# Patient Record
Sex: FEMALE | Race: WHITE | NOT HISPANIC OR LATINO | Employment: FULL TIME | URBAN - METROPOLITAN AREA
[De-identification: names, ages, dates, MRNs, and addresses within clinical notes are randomized per-mention and may not be internally consistent; named-entity substitution may affect disease eponyms.]

---

## 2017-12-16 ENCOUNTER — HOSPITAL ENCOUNTER (OUTPATIENT)
Dept: OBGYN | Facility: HOSPITAL | Age: 27
Discharge: HOME | End: 2017-12-16
Attending: OBSTETRICS & GYNECOLOGY | Admitting: OBSTETRICS & GYNECOLOGY

## 2018-02-22 ENCOUNTER — HOME CARE/HOSPICE - HEALTHEAST (OUTPATIENT)
Dept: HOME HEALTH SERVICES | Facility: HOME HEALTH | Age: 28
End: 2018-02-22

## 2018-02-26 ENCOUNTER — COMMUNICATION - HEALTHEAST (OUTPATIENT)
Dept: OBGYN | Facility: CLINIC | Age: 28
End: 2018-02-26

## 2018-03-20 ENCOUNTER — COMMUNICATION - HEALTHEAST (OUTPATIENT)
Dept: HEALTH INFORMATION MANAGEMENT | Facility: CLINIC | Age: 28
End: 2018-03-20

## 2021-05-30 ENCOUNTER — RECORDS - HEALTHEAST (OUTPATIENT)
Dept: ADMINISTRATIVE | Facility: CLINIC | Age: 31
End: 2021-05-30

## 2021-06-14 NOTE — PROGRESS NOTES
Pt states had possible pink discharge on toilet paper when voiding today.  No bleeding, no fluid leaking, denies contractions.  vag exam closed, no bleeding seen.  Dr oro aware, discharged with instructions

## 2021-07-06 ENCOUNTER — RECORDS - HEALTHEAST (OUTPATIENT)
Dept: LAB | Facility: CLINIC | Age: 31
End: 2021-07-06

## 2021-07-06 LAB — POTASSIUM BLD-SCNC: 4 MMOL/L (ref 3.5–5)

## 2021-07-20 PROCEDURE — 88305 TISSUE EXAM BY PATHOLOGIST: CPT | Mod: TC,ORL | Performed by: PLASTIC SURGERY

## 2021-07-21 ENCOUNTER — LAB REQUISITION (OUTPATIENT)
Dept: LAB | Facility: CLINIC | Age: 31
End: 2021-07-21
Payer: COMMERCIAL

## 2021-07-21 DIAGNOSIS — N64.4 MASTODYNIA: ICD-10-CM

## 2021-07-27 LAB
PATH REPORT.COMMENTS IMP SPEC: NORMAL
PATH REPORT.COMMENTS IMP SPEC: NORMAL
PATH REPORT.FINAL DX SPEC: NORMAL
PATH REPORT.GROSS SPEC: NORMAL
PATH REPORT.MICROSCOPIC SPEC OTHER STN: NORMAL
PATH REPORT.RELEVANT HX SPEC: NORMAL
PHOTO IMAGE: NORMAL

## 2021-07-27 PROCEDURE — 88305 TISSUE EXAM BY PATHOLOGIST: CPT | Mod: 26 | Performed by: PATHOLOGY

## 2021-08-21 ENCOUNTER — HEALTH MAINTENANCE LETTER (OUTPATIENT)
Age: 31
End: 2021-08-21

## 2021-10-16 ENCOUNTER — HEALTH MAINTENANCE LETTER (OUTPATIENT)
Age: 31
End: 2021-10-16

## 2022-10-01 ENCOUNTER — HEALTH MAINTENANCE LETTER (OUTPATIENT)
Age: 32
End: 2022-10-01

## 2022-12-02 ENCOUNTER — LAB REQUISITION (OUTPATIENT)
Dept: LAB | Facility: CLINIC | Age: 32
End: 2022-12-02
Payer: COMMERCIAL

## 2022-12-02 DIAGNOSIS — Z36.9 ENCOUNTER FOR ANTENATAL SCREENING, UNSPECIFIED: ICD-10-CM

## 2022-12-02 LAB
ABO/RH(D): NORMAL
ANTIBODY SCREEN: NEGATIVE
BASOPHILS # BLD AUTO: 0 10E3/UL (ref 0–0.2)
BASOPHILS NFR BLD AUTO: 0 %
EOSINOPHIL # BLD AUTO: 0.3 10E3/UL (ref 0–0.7)
EOSINOPHIL NFR BLD AUTO: 3 %
ERYTHROCYTE [DISTWIDTH] IN BLOOD BY AUTOMATED COUNT: 14.1 % (ref 10–15)
HBV SURFACE AG SERPL QL IA: NONREACTIVE
HCT VFR BLD AUTO: 39.1 % (ref 35–47)
HCV AB SERPL QL IA: NONREACTIVE
HGB BLD-MCNC: 12.6 G/DL (ref 11.7–15.7)
HIV 1+2 AB+HIV1 P24 AG SERPL QL IA: NONREACTIVE
IMM GRANULOCYTES # BLD: 0 10E3/UL
IMM GRANULOCYTES NFR BLD: 0 %
LYMPHOCYTES # BLD AUTO: 2 10E3/UL (ref 0.8–5.3)
LYMPHOCYTES NFR BLD AUTO: 19 %
MCH RBC QN AUTO: 28.2 PG (ref 26.5–33)
MCHC RBC AUTO-ENTMCNC: 32.2 G/DL (ref 31.5–36.5)
MCV RBC AUTO: 88 FL (ref 78–100)
MONOCYTES # BLD AUTO: 0.6 10E3/UL (ref 0–1.3)
MONOCYTES NFR BLD AUTO: 6 %
NEUTROPHILS # BLD AUTO: 7.7 10E3/UL (ref 1.6–8.3)
NEUTROPHILS NFR BLD AUTO: 72 %
NRBC # BLD AUTO: 0 10E3/UL
NRBC BLD AUTO-RTO: 0 /100
PLATELET # BLD AUTO: 351 10E3/UL (ref 150–450)
RBC # BLD AUTO: 4.47 10E6/UL (ref 3.8–5.2)
SPECIMEN EXPIRATION DATE: NORMAL
T PALLIDUM AB SER QL: NONREACTIVE
WBC # BLD AUTO: 10.7 10E3/UL (ref 4–11)

## 2022-12-02 PROCEDURE — 87389 HIV-1 AG W/HIV-1&-2 AB AG IA: CPT | Mod: ORL | Performed by: NURSE PRACTITIONER

## 2022-12-02 PROCEDURE — 85025 COMPLETE CBC W/AUTO DIFF WBC: CPT | Mod: ORL | Performed by: NURSE PRACTITIONER

## 2022-12-02 PROCEDURE — 87086 URINE CULTURE/COLONY COUNT: CPT | Mod: ORL | Performed by: NURSE PRACTITIONER

## 2022-12-02 PROCEDURE — 86850 RBC ANTIBODY SCREEN: CPT | Mod: ORL | Performed by: NURSE PRACTITIONER

## 2022-12-02 PROCEDURE — 86803 HEPATITIS C AB TEST: CPT | Mod: ORL | Performed by: NURSE PRACTITIONER

## 2022-12-02 PROCEDURE — 86762 RUBELLA ANTIBODY: CPT | Mod: ORL | Performed by: NURSE PRACTITIONER

## 2022-12-02 PROCEDURE — 87340 HEPATITIS B SURFACE AG IA: CPT | Mod: ORL | Performed by: NURSE PRACTITIONER

## 2022-12-02 PROCEDURE — 86901 BLOOD TYPING SEROLOGIC RH(D): CPT | Mod: ORL | Performed by: NURSE PRACTITIONER

## 2022-12-02 PROCEDURE — 86780 TREPONEMA PALLIDUM: CPT | Mod: ORL | Performed by: NURSE PRACTITIONER

## 2022-12-04 LAB — BACTERIA UR CULT: NORMAL

## 2022-12-05 LAB
RUBV IGG SERPL QL IA: 3.52 INDEX
RUBV IGG SERPL QL IA: POSITIVE

## 2023-01-18 ENCOUNTER — LAB REQUISITION (OUTPATIENT)
Dept: LAB | Facility: CLINIC | Age: 33
End: 2023-01-18

## 2023-01-18 DIAGNOSIS — R51.9 HEADACHE, UNSPECIFIED: ICD-10-CM

## 2023-01-18 LAB
ALBUMIN MFR UR ELPH: 8.8 MG/DL (ref 1–14)
ALT SERPL W P-5'-P-CCNC: 40 U/L (ref 10–35)
AST SERPL W P-5'-P-CCNC: 25 U/L (ref 10–35)
CREAT SERPL-MCNC: 0.52 MG/DL (ref 0.51–0.95)
CREAT UR-MCNC: 109 MG/DL
ERYTHROCYTE [DISTWIDTH] IN BLOOD BY AUTOMATED COUNT: 14 % (ref 10–15)
GFR SERPL CREATININE-BSD FRML MDRD: >90 ML/MIN/1.73M2
HCT VFR BLD AUTO: 36.6 % (ref 35–47)
HGB BLD-MCNC: 11.9 G/DL (ref 11.7–15.7)
MCH RBC QN AUTO: 27.7 PG (ref 26.5–33)
MCHC RBC AUTO-ENTMCNC: 32.5 G/DL (ref 31.5–36.5)
MCV RBC AUTO: 85 FL (ref 78–100)
PLATELET # BLD AUTO: 296 10E3/UL (ref 150–450)
PROT/CREAT 24H UR: 0.08 MG/MG CR (ref 0–0.2)
RBC # BLD AUTO: 4.29 10E6/UL (ref 3.8–5.2)
WBC # BLD AUTO: 10.2 10E3/UL (ref 4–11)

## 2023-01-18 PROCEDURE — 84460 ALANINE AMINO (ALT) (SGPT): CPT | Performed by: OBSTETRICS & GYNECOLOGY

## 2023-01-18 PROCEDURE — 82565 ASSAY OF CREATININE: CPT | Performed by: OBSTETRICS & GYNECOLOGY

## 2023-01-18 PROCEDURE — 84450 TRANSFERASE (AST) (SGOT): CPT | Performed by: OBSTETRICS & GYNECOLOGY

## 2023-01-18 PROCEDURE — 85027 COMPLETE CBC AUTOMATED: CPT | Performed by: OBSTETRICS & GYNECOLOGY

## 2023-01-18 PROCEDURE — 84156 ASSAY OF PROTEIN URINE: CPT | Performed by: OBSTETRICS & GYNECOLOGY

## 2023-04-13 ENCOUNTER — HOSPITAL ENCOUNTER (EMERGENCY)
Facility: CLINIC | Age: 33
Discharge: HOME | End: 2023-04-14
Attending: EMERGENCY MEDICINE | Admitting: EMERGENCY MEDICINE
Payer: COMMERCIAL

## 2023-04-13 ENCOUNTER — APPOINTMENT (OUTPATIENT)
Dept: ULTRASOUND IMAGING | Facility: CLINIC | Age: 33
End: 2023-04-13
Attending: EMERGENCY MEDICINE
Payer: COMMERCIAL

## 2023-04-13 DIAGNOSIS — M79.89 SWELLING OF LEFT FOOT: ICD-10-CM

## 2023-04-13 DIAGNOSIS — Z33.1 PREGNANT STATE, INCIDENTAL: ICD-10-CM

## 2023-04-13 PROCEDURE — 99284 EMERGENCY DEPT VISIT MOD MDM: CPT | Mod: 25

## 2023-04-13 PROCEDURE — 93971 EXTREMITY STUDY: CPT | Mod: LT

## 2023-04-13 RX ORDER — BUTALBITAL, ACETAMINOPHEN AND CAFFEINE 50; 325; 40 MG/1; MG/1; MG/1
TABLET ORAL
COMMUNITY

## 2023-04-13 RX ORDER — FLUOXETINE 40 MG/1
1 CAPSULE ORAL DAILY
Status: ON HOLD | COMMUNITY
Start: 2022-05-19 | End: 2023-06-25

## 2023-04-13 RX ORDER — PRENATAL VIT 116/IRON/FA/DHA 28-800-200
CAPSULE ORAL
COMMUNITY

## 2023-04-13 ASSESSMENT — ACTIVITIES OF DAILY LIVING (ADL): ADLS_ACUITY_SCORE: 35

## 2023-04-14 VITALS
SYSTOLIC BLOOD PRESSURE: 129 MMHG | HEART RATE: 84 BPM | TEMPERATURE: 97.8 F | WEIGHT: 277 LBS | OXYGEN SATURATION: 97 % | DIASTOLIC BLOOD PRESSURE: 60 MMHG | BODY MASS INDEX: 50.97 KG/M2 | RESPIRATION RATE: 16 BRPM | HEIGHT: 62 IN

## 2023-04-14 NOTE — DISCHARGE INSTRUCTIONS
You were seen in the Emergency Department today for leg and foot swelling.     I would recommend you continue trying to elevate your legs is much as possible to help decrease the swelling.      Please return to the ER if you experience chest pain, trouble breathing, and/or for any other new or concerning symptoms, otherwise please follow up with your primary doctor and OB/GYN for recheck.     Below is some information you might find useful.     Thank you for choosing [facility]. It was a pleasure taking care of you today!  - Dr. Jennifer Lovelace

## 2023-04-14 NOTE — ED PROVIDER NOTES
EMERGENCY DEPARTMENT ENCOUNTER      NAME: Ana Deng  YOB: 1990  MRN: [id number]    FINAL IMPRESSION  1. Swelling of left foot    2. Pregnant state, incidental        MEDICAL DECISION MAKING   Pertinent Labs & Imaging studies reviewed. (See chart for details)    Ana Deng is a 33 year old female who presents for evaluation of left foot swelling.  Patient is 29 weeks pregnant and reports that she has had some intermittent leg swelling during the pregnancy but earlier today when she got home from work, noticed that her left foot was significantly more swollen than the right.  She tried to elevate her legs and spoke with the nursing care line who advised that she monitor then follow-up tomorrow if no improvement.  She became anxious about potential blood clot so decided to come in.  She notes that since she got home from work and after elevating her legs, the swelling has decreased somewhat.  Patient travels and drives frequently for work.  She does not have a personal history of blood clots.  She reports that the pregnancy has been complicated mostly by feeling uncomfortable, occasional dyspnea, and nausea.  She notes that earlier in the pregnancy, she did have help syndrome ruled out.  Most recent appointment with OB went well.  Patient has no other new complaints and denies chest pain, dyspnea, fevers, abdominal pain, vaginal bleeding or loss of fluid. Remainder of history and exam, as below.     I considered a broad differential including but not limited to DVT, lymphedema, venous insufficiency, lymphangitis or lymph obstruction, cellulitis, muscular strain/sprain.  There is no significant erythema, warmth to suggest infectious process.  Discussed options for work-up and management with patient.  We have agreed on plan for ultrasound to rule out DVT or other vascular process.  Patient declined offer for analgesic.    I rechecked the patient.  Unfortunately, there was some delay in obtaining  imaging due to backup in the department.  She remained comfortable without any symptoms.  Patient was signed out to night ED provider pending results of ultrasound.  I anticipate she will be able to discharge home when this returns and follow-up closely with OB/GYN.        Medical Decision Making    History:    Supplemental history from: Documented in chart, if applicable    External Record(s) reviewed: Documented in chart, if applicable.    Work Up:    Chart documentation includes differential considered and any EKGs or imaging independently interpreted by provider, where specified.    In additional to work up documented, I considered the following work up: Documented in chart, if applicable.    External consultation:    Discussion of management with another provider: Documented in chart, if applicable    Complicating factors:    Care impacted by chronic illness: Mental Health    Care affected by social determinants of health: N/A    Disposition considerations: Admission considered. Patient was signed out to the oncoming physician, disposition pending.      ED COURSE  10:06 PM I met the patient and performed my initial interview and exam.    11:28 PM I rechecked the patient.  She is still awaiting ultrasound.    MEDICATIONS GIVEN IN THE ED  Medications - No data to display    NEW PRESCRIPTIONS STARTED AT TODAY'S VISIT  New Prescriptions    No medications on file          =================================================================    Chief Complaint   Patient presents with     Leg Swelling     Left greater than right         HPI:    Patient information was obtained from: Patient    Use of : N/A     Ana Deng is a 33 year old female who presents with leg swelling.     Patient reports that she is 27 weeks pregnant and has had normal leg swelling with this. Today she had an episode of extra swelling to her left foot that was much greater than her right. She elevated her leg for an hour which  "reduced the swelling. Patient is concerned for a blood clot as she had one episode of shooting pain during the swelling. Patient notes she travels for work and has not been taking a baby aspirin. No trauma to foot. Denies any fever, chills, chest pain, or any other complaints at this time.       RELEVANT HISTORY, MEDICATIONS, & ALLERGIES   Past medical history, surgical history, family history, medications, and allergies reviewed and pertinent noted in HPI.    REVIEW OF SYSTEMS:  A complete review of systems was performed with pertinent positives and negatives noted in the HPI. All other systems negative.     PHYSICAL EXAM:    Vitals: /84   Pulse 90   Temp 97.8  F (36.6  C) (Temporal)   Resp 15   Ht 1.575 m (5' 2\")   Wt 125.6 kg (277 lb)   SpO2 98%   BMI 50.66 kg/m     General: Alert and interactive, comfortable appearing.  HENT: Atraumatic. Full AROM of neck. Conjunctiva clear.   Cardiovascular: Regular rate and rhythm.   Chest/Pulmonary: Normal work of breathing. Speaking in complete sentences.   Abdomen: Soft, gravid. Nontender without guarding or rebound.  Extremities: Normal AROM of all major joints.  No tenderness to palpation of calves.  Left lower extremity: Very mild edema of left foot compared to contralateral side.  No overlying erythema, warmth, tenderness.  No visible rashes or deformities.  Palpable pulses.  Sensation intact.  Skin: Warm and dry. Normal skin color.   Neuro: Speech clear. CNs grossly intact. Moves all extremities spontaneously.  Ambulatory.  Psych: Normal affect/mood, cooperative, memory appropriate.      RADIOLOGY  US Lower Extremity Venous Duplex Left    (Results Pending)       I, Manpreet Melendez, am serving as a scribe to document services personally performed by Dr. Jennifer Lovelace based on my observation and the provider's statements to me. I, Jennifer Lovelace MD attest that Manpreet Melendez is acting in a scribe capacity, has observed my performance of the services and has documented " them in accordance with my direction.    Jennifer Lovelace M.D.  Emergency Medicine  [facility] EMERGENCY ROOM  [address] [place] [id number]  [phone]  Dept: [phone]     Jennifer Lovelace MD  04/13/23 7155

## 2023-04-14 NOTE — ED TRIAGE NOTES
Pt reports that she is 27 weeks pregnant. And tonight nooted increased edema of her left greater than right ankle.   Pt also rports discomfort that is generalized r/t her pregnancy. 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

## 2023-04-14 NOTE — ED NOTES
"EMERGENCY DEPARTMENT SIGN OUT NOTE        ED COURSE AND MEDICAL DECISION MAKING  Patient was signed out to me by Dr Jennifer Lovelace at 11:54 PM     In brief, Ana Deng is a 33 year old female who initially presented with leg swelling     \"Patient reports that she is 27 weeks pregnant and has had normal leg swelling with this. Today she had an episode of extra swelling to her left foot that was much greater than her right. She elevated her leg for an hour which reduced the swelling. Patient is concerned for a blood clot as she had one episode of shooting pain during the swelling. Patient notes she travels for work and has not been taking a baby aspirin. No trauma to foot. Denies any fever, chills, chest pain, or any other complaints at this time. \"    At time of sign out, disposition was pending ultrasound results     Ultrasound negative.  Likely due to uterus compressing vasculature.  Discussed this with the patient.  She will follow-up with OB.    FINAL IMPRESSION    1. Swelling of left foot    2. Pregnant state, incidental        ED MEDS  Medications - No data to display    LAB  Labs Ordered and Resulted from Time of ED Arrival to Time of ED Departure - No data to display    EKG      RADIOLOGY    US Lower Extremity Venous Duplex Left    (Results Pending)       DISCHARGE MEDS  New Prescriptions    No medications on file       Blanco Plasencia MD  [facility] EMERGENCY ROOM  [address] [place] [id number] [phone]     Blanco Plasencia MD  04/14/23 0029    "

## 2023-04-21 ENCOUNTER — LAB REQUISITION (OUTPATIENT)
Dept: LAB | Facility: CLINIC | Age: 33
End: 2023-04-21

## 2023-04-21 DIAGNOSIS — Z11.3 ENCOUNTER FOR SCREENING FOR INFECTIONS WITH A PREDOMINANTLY SEXUAL MODE OF TRANSMISSION: ICD-10-CM

## 2023-04-21 PROCEDURE — 86780 TREPONEMA PALLIDUM: CPT | Performed by: NURSE PRACTITIONER

## 2023-04-22 LAB — T PALLIDUM AB SER QL: NONREACTIVE

## 2023-04-26 ENCOUNTER — PRE VISIT (OUTPATIENT)
Dept: MATERNAL FETAL MEDICINE | Facility: HOSPITAL | Age: 33
End: 2023-04-26
Payer: COMMERCIAL

## 2023-04-26 ENCOUNTER — TRANSCRIBE ORDERS (OUTPATIENT)
Dept: MATERNAL FETAL MEDICINE | Facility: HOSPITAL | Age: 33
End: 2023-04-26
Payer: COMMERCIAL

## 2023-04-26 DIAGNOSIS — O26.90 PREGNANCY RELATED CONDITION, ANTEPARTUM: Primary | ICD-10-CM

## 2023-05-01 ENCOUNTER — OFFICE VISIT (OUTPATIENT)
Dept: MATERNAL FETAL MEDICINE | Facility: HOSPITAL | Age: 33
End: 2023-05-01
Attending: NURSE PRACTITIONER
Payer: COMMERCIAL

## 2023-05-01 ENCOUNTER — ANCILLARY PROCEDURE (OUTPATIENT)
Dept: ULTRASOUND IMAGING | Facility: HOSPITAL | Age: 33
End: 2023-05-01
Attending: NURSE PRACTITIONER
Payer: COMMERCIAL

## 2023-05-01 DIAGNOSIS — O35.9XX0 SUSPECTED FETAL ANOMALY, ANTEPARTUM, SINGLE OR UNSPECIFIED FETUS: Primary | ICD-10-CM

## 2023-05-01 DIAGNOSIS — Z03.73 SUSPECTED FETAL ANOMALY NOT FOUND: ICD-10-CM

## 2023-05-01 DIAGNOSIS — O26.90 PREGNANCY RELATED CONDITION, ANTEPARTUM: ICD-10-CM

## 2023-05-01 DIAGNOSIS — O99.210 OBESITY IN PREGNANCY: ICD-10-CM

## 2023-05-01 PROCEDURE — 76811 OB US DETAILED SNGL FETUS: CPT

## 2023-05-01 PROCEDURE — 99203 OFFICE O/P NEW LOW 30 MIN: CPT | Mod: 25 | Performed by: OBSTETRICS & GYNECOLOGY

## 2023-05-01 PROCEDURE — 76811 OB US DETAILED SNGL FETUS: CPT | Mod: 26 | Performed by: OBSTETRICS & GYNECOLOGY

## 2023-05-01 NOTE — NURSING NOTE
Patient reports positive fetal movement, denies pain, denies contractions/pre-term labor, leaking of fluid, or bleeding. Patient denies headache, visual changes, nausea/vomiting, epigastric pain related to preeclampsia. Education provided to patient on L2, POC. SBAR given to MICHELLE BILLINGS, see their note in Epic.    Ayleen Viveros RN

## 2023-05-01 NOTE — PROGRESS NOTES
[facility] Clinic Visit    Ana presents to [facility] clinic for ultrasound and recommendations. The following problems were addressed:    Maternal obesity  Suspected fetal anomaly, not found    Tests Reviewed: cell free DNA screen, outside ultrasound report  Tests Ordered: none  Unique Records reviewed: na    Impression:  1) Quarles intrauterine pregnancy at 29w 4d gestational age.   2) None of the anomalies commonly detected by ultrasound were evident in the detailed fetal anatomic survey, however the ductal arch, hands and feet were seen suboptimally due to late gestational age.   3) Growth parameters and estimated fetal weight were consistent with established dates.  4) The amniotic fluid volume appeared normal.  5) Normal fetal activity for gestational age.    Plan:  Thank-you for referring your patient for a comprehensive ultrasound.  She had cell-free DNA screening showing the expected amounts of chromosomes 21, 18 & 13.    I discussed the findings on today's ultrasound with the patient. I reviewed that at a late gestational age, hands and feel are difficult to fully evaluate, and I don't anticipate this will get better over time. Given this, and the otherwise normal anatomy, Ana will plan to have subsequent ultrasounds done in your office. Due to BMI > 40, recommend a follow up ultrasound to assess fetal growth at 34 weeks with weekly BPP starting at that time.    Return to primary provider for continued prenatal care.    If you have questions regarding today's evaluation or if we can be of further service, please contact the [facility].    **Fetal anomalies may be present but not detected**    Amy Hatch MD  Maternal Fetal Medicine    Total Time: 10 minutes spent on the date of the encounter doing chart review, history and exam, documentation and further activities as noted above.

## 2023-05-19 ENCOUNTER — LAB REQUISITION (OUTPATIENT)
Dept: LAB | Facility: CLINIC | Age: 33
End: 2023-05-19

## 2023-05-19 DIAGNOSIS — O13.9 GESTATIONAL (PREGNANCY-INDUCED) HYPERTENSION WITHOUT SIGNIFICANT PROTEINURIA, UNSPECIFIED TRIMESTER: ICD-10-CM

## 2023-05-19 LAB
BASOPHILS # BLD AUTO: 0 10E3/UL (ref 0–0.2)
BASOPHILS NFR BLD AUTO: 0 %
EOSINOPHIL # BLD AUTO: 0.3 10E3/UL (ref 0–0.7)
EOSINOPHIL NFR BLD AUTO: 3 %
ERYTHROCYTE [DISTWIDTH] IN BLOOD BY AUTOMATED COUNT: 15.5 % (ref 10–15)
HCT VFR BLD AUTO: 36 % (ref 35–47)
HGB BLD-MCNC: 11.2 G/DL (ref 11.7–15.7)
IMM GRANULOCYTES # BLD: 0.1 10E3/UL
IMM GRANULOCYTES NFR BLD: 1 %
LYMPHOCYTES # BLD AUTO: 2.1 10E3/UL (ref 0.8–5.3)
LYMPHOCYTES NFR BLD AUTO: 22 %
MCH RBC QN AUTO: 27 PG (ref 26.5–33)
MCHC RBC AUTO-ENTMCNC: 31.1 G/DL (ref 31.5–36.5)
MCV RBC AUTO: 87 FL (ref 78–100)
MONOCYTES # BLD AUTO: 0.6 10E3/UL (ref 0–1.3)
MONOCYTES NFR BLD AUTO: 6 %
NEUTROPHILS # BLD AUTO: 6.6 10E3/UL (ref 1.6–8.3)
NEUTROPHILS NFR BLD AUTO: 68 %
NRBC # BLD AUTO: 0 10E3/UL
NRBC BLD AUTO-RTO: 0 /100
PLATELET # BLD AUTO: 281 10E3/UL (ref 150–450)
RBC # BLD AUTO: 4.15 10E6/UL (ref 3.8–5.2)
WBC # BLD AUTO: 9.7 10E3/UL (ref 4–11)

## 2023-05-19 PROCEDURE — 84550 ASSAY OF BLOOD/URIC ACID: CPT | Performed by: OBSTETRICS & GYNECOLOGY

## 2023-05-19 PROCEDURE — 85025 COMPLETE CBC W/AUTO DIFF WBC: CPT | Performed by: OBSTETRICS & GYNECOLOGY

## 2023-05-19 PROCEDURE — 84156 ASSAY OF PROTEIN URINE: CPT | Performed by: OBSTETRICS & GYNECOLOGY

## 2023-05-19 PROCEDURE — 80053 COMPREHEN METABOLIC PANEL: CPT | Performed by: OBSTETRICS & GYNECOLOGY

## 2023-05-20 LAB
ALBUMIN MFR UR ELPH: 8.9 MG/DL (ref 1–14)
ALBUMIN SERPL BCG-MCNC: 3.4 G/DL (ref 3.5–5.2)
ALP SERPL-CCNC: 168 U/L (ref 35–104)
ALT SERPL W P-5'-P-CCNC: 22 U/L (ref 10–35)
ANION GAP SERPL CALCULATED.3IONS-SCNC: 10 MMOL/L (ref 7–15)
AST SERPL W P-5'-P-CCNC: 17 U/L (ref 10–35)
BILIRUB SERPL-MCNC: 0.2 MG/DL
BUN SERPL-MCNC: 7.2 MG/DL (ref 6–20)
CALCIUM SERPL-MCNC: 9.2 MG/DL (ref 8.6–10)
CHLORIDE SERPL-SCNC: 100 MMOL/L (ref 98–107)
CREAT SERPL-MCNC: 0.49 MG/DL (ref 0.51–0.95)
CREAT UR-MCNC: 124 MG/DL
DEPRECATED HCO3 PLAS-SCNC: 22 MMOL/L (ref 22–29)
GFR SERPL CREATININE-BSD FRML MDRD: >90 ML/MIN/1.73M2
GLUCOSE SERPL-MCNC: 94 MG/DL (ref 70–99)
POTASSIUM SERPL-SCNC: 4.3 MMOL/L (ref 3.4–5.3)
PROT SERPL-MCNC: 6.2 G/DL (ref 6.4–8.3)
PROT/CREAT 24H UR: 0.07 MG/MG CR (ref 0–0.2)
SODIUM SERPL-SCNC: 132 MMOL/L (ref 136–145)
URATE SERPL-MCNC: 4.8 MG/DL (ref 2.4–5.7)

## 2023-06-16 ENCOUNTER — LAB REQUISITION (OUTPATIENT)
Dept: LAB | Facility: CLINIC | Age: 33
End: 2023-06-16

## 2023-06-16 ENCOUNTER — LAB REQUISITION (OUTPATIENT)
Dept: LAB | Facility: CLINIC | Age: 33
End: 2023-06-16
Payer: COMMERCIAL

## 2023-06-16 DIAGNOSIS — Z3A.35 35 WEEKS GESTATION OF PREGNANCY: ICD-10-CM

## 2023-06-16 DIAGNOSIS — Z3A.36 36 WEEKS GESTATION OF PREGNANCY: ICD-10-CM

## 2023-06-16 DIAGNOSIS — O13.9 GESTATIONAL (PREGNANCY-INDUCED) HYPERTENSION WITHOUT SIGNIFICANT PROTEINURIA, UNSPECIFIED TRIMESTER: ICD-10-CM

## 2023-06-16 LAB
ALBUMIN MFR UR ELPH: 18.7 MG/DL
CREAT UR-MCNC: 223 MG/DL
ERYTHROCYTE [DISTWIDTH] IN BLOOD BY AUTOMATED COUNT: 15.6 % (ref 10–15)
HCT VFR BLD AUTO: 36.9 % (ref 35–47)
HGB BLD-MCNC: 11.6 G/DL (ref 11.7–15.7)
MCH RBC QN AUTO: 26.9 PG (ref 26.5–33)
MCHC RBC AUTO-ENTMCNC: 31.4 G/DL (ref 31.5–36.5)
MCV RBC AUTO: 86 FL (ref 78–100)
PLATELET # BLD AUTO: 297 10E3/UL (ref 150–450)
PROT/CREAT 24H UR: 0.08 MG/MG CR (ref 0–0.2)
RBC # BLD AUTO: 4.31 10E6/UL (ref 3.8–5.2)
WBC # BLD AUTO: 11 10E3/UL (ref 4–11)

## 2023-06-16 PROCEDURE — 87653 STREP B DNA AMP PROBE: CPT | Mod: ORL | Performed by: OBSTETRICS & GYNECOLOGY

## 2023-06-16 PROCEDURE — 84156 ASSAY OF PROTEIN URINE: CPT | Performed by: OBSTETRICS & GYNECOLOGY

## 2023-06-16 PROCEDURE — 85014 HEMATOCRIT: CPT | Performed by: OBSTETRICS & GYNECOLOGY

## 2023-06-16 PROCEDURE — 84550 ASSAY OF BLOOD/URIC ACID: CPT | Performed by: OBSTETRICS & GYNECOLOGY

## 2023-06-16 PROCEDURE — 80053 COMPREHEN METABOLIC PANEL: CPT | Performed by: OBSTETRICS & GYNECOLOGY

## 2023-06-17 LAB
ALBUMIN SERPL BCG-MCNC: 3.6 G/DL (ref 3.5–5.2)
ALP SERPL-CCNC: 204 U/L (ref 35–104)
ALT SERPL W P-5'-P-CCNC: 9 U/L (ref 0–50)
ANION GAP SERPL CALCULATED.3IONS-SCNC: 12 MMOL/L (ref 7–15)
AST SERPL W P-5'-P-CCNC: 15 U/L (ref 0–45)
BILIRUB SERPL-MCNC: <0.2 MG/DL
BUN SERPL-MCNC: 7.7 MG/DL (ref 6–20)
CALCIUM SERPL-MCNC: 9.3 MG/DL (ref 8.6–10)
CHLORIDE SERPL-SCNC: 104 MMOL/L (ref 98–107)
CREAT SERPL-MCNC: 0.55 MG/DL (ref 0.51–0.95)
DEPRECATED HCO3 PLAS-SCNC: 21 MMOL/L (ref 22–29)
GFR SERPL CREATININE-BSD FRML MDRD: >90 ML/MIN/1.73M2
GLUCOSE SERPL-MCNC: 94 MG/DL (ref 70–99)
POTASSIUM SERPL-SCNC: 4.3 MMOL/L (ref 3.4–5.3)
PROT SERPL-MCNC: 6.7 G/DL (ref 6.4–8.3)
SODIUM SERPL-SCNC: 137 MMOL/L (ref 136–145)
URATE SERPL-MCNC: 5.3 MG/DL (ref 2.4–5.7)

## 2023-06-18 LAB — GP B STREP DNA SPEC QL NAA+PROBE: NEGATIVE

## 2023-06-22 ENCOUNTER — HOSPITAL ENCOUNTER (INPATIENT)
Facility: CLINIC | Age: 33
LOS: 3 days | Discharge: HOME | End: 2023-06-25
Attending: OBSTETRICS & GYNECOLOGY | Admitting: OBSTETRICS & GYNECOLOGY
Payer: COMMERCIAL

## 2023-06-22 PROBLEM — Z36.89 ENCOUNTER FOR TRIAGE IN PREGNANT PATIENT: Status: ACTIVE | Noted: 2023-06-22

## 2023-06-22 PROBLEM — O13.9 PIH (PREGNANCY INDUCED HYPERTENSION): Status: ACTIVE | Noted: 2023-06-22

## 2023-06-22 LAB
ABO/RH(D): NORMAL
ALBUMIN MFR UR ELPH: 9.5 MG/DL (ref 1–14)
ALBUMIN SERPL-MCNC: 2.6 G/DL (ref 3.5–5)
ALP SERPL-CCNC: 187 U/L (ref 45–120)
ALT SERPL W P-5'-P-CCNC: <9 U/L (ref 0–45)
ANION GAP SERPL CALCULATED.3IONS-SCNC: 8 MMOL/L (ref 5–18)
ANTIBODY SCREEN: NEGATIVE
APTT PPP: 28 SECONDS (ref 22–38)
AST SERPL W P-5'-P-CCNC: 12 U/L (ref 0–40)
BILIRUB SERPL-MCNC: 0.2 MG/DL (ref 0–1)
BUN SERPL-MCNC: 6 MG/DL (ref 8–22)
CALCIUM SERPL-MCNC: 9.1 MG/DL (ref 8.5–10.5)
CHLORIDE BLD-SCNC: 108 MMOL/L (ref 98–107)
CO2 SERPL-SCNC: 22 MMOL/L (ref 22–31)
CREAT SERPL-MCNC: 0.59 MG/DL (ref 0.6–1.1)
CREAT UR-MCNC: 139 MG/DL
ERYTHROCYTE [DISTWIDTH] IN BLOOD BY AUTOMATED COUNT: 15.7 % (ref 10–15)
GFR SERPL CREATININE-BSD FRML MDRD: >90 ML/MIN/1.73M2
GLUCOSE BLD-MCNC: 98 MG/DL (ref 70–125)
HCT VFR BLD AUTO: 33.2 % (ref 35–47)
HGB BLD-MCNC: 10.9 G/DL (ref 11.7–15.7)
INR PPP: 1.03 (ref 0.85–1.15)
MCH RBC QN AUTO: 27.5 PG (ref 26.5–33)
MCHC RBC AUTO-ENTMCNC: 32.8 G/DL (ref 31.5–36.5)
MCV RBC AUTO: 84 FL (ref 78–100)
PLATELET # BLD AUTO: 242 10E3/UL (ref 150–450)
POTASSIUM BLD-SCNC: 4.1 MMOL/L (ref 3.5–5)
PROT SERPL-MCNC: 6.3 G/DL (ref 6–8)
PROT/CREAT 24H UR: 0.07 MG/MG CR
RBC # BLD AUTO: 3.97 10E6/UL (ref 3.8–5.2)
SODIUM SERPL-SCNC: 138 MMOL/L (ref 136–145)
SPECIMEN EXPIRATION DATE: NORMAL
WBC # BLD AUTO: 9.3 10E3/UL (ref 4–11)

## 2023-06-22 PROCEDURE — 120N000001 HC R&B MED SURG/OB

## 2023-06-22 PROCEDURE — 36415 COLL VENOUS BLD VENIPUNCTURE: CPT | Performed by: OBSTETRICS & GYNECOLOGY

## 2023-06-22 PROCEDURE — 250N000013 HC RX MED GY IP 250 OP 250 PS 637: Performed by: OBSTETRICS & GYNECOLOGY

## 2023-06-22 PROCEDURE — 86901 BLOOD TYPING SEROLOGIC RH(D): CPT | Performed by: OBSTETRICS & GYNECOLOGY

## 2023-06-22 PROCEDURE — 250N000013 HC RX MED GY IP 250 OP 250 PS 637

## 2023-06-22 PROCEDURE — 85730 THROMBOPLASTIN TIME PARTIAL: CPT | Performed by: OBSTETRICS & GYNECOLOGY

## 2023-06-22 PROCEDURE — 85027 COMPLETE CBC AUTOMATED: CPT | Performed by: OBSTETRICS & GYNECOLOGY

## 2023-06-22 PROCEDURE — 250N000011 HC RX IP 250 OP 636: Mod: JZ | Performed by: OBSTETRICS & GYNECOLOGY

## 2023-06-22 PROCEDURE — 84156 ASSAY OF PROTEIN URINE: CPT | Performed by: OBSTETRICS & GYNECOLOGY

## 2023-06-22 PROCEDURE — 80053 COMPREHEN METABOLIC PANEL: CPT | Performed by: OBSTETRICS & GYNECOLOGY

## 2023-06-22 PROCEDURE — 85610 PROTHROMBIN TIME: CPT | Performed by: OBSTETRICS & GYNECOLOGY

## 2023-06-22 PROCEDURE — 86850 RBC ANTIBODY SCREEN: CPT | Performed by: OBSTETRICS & GYNECOLOGY

## 2023-06-22 RX ORDER — SODIUM CHLORIDE, SODIUM LACTATE, POTASSIUM CHLORIDE, CALCIUM CHLORIDE 600; 310; 30; 20 MG/100ML; MG/100ML; MG/100ML; MG/100ML
INJECTION, SOLUTION INTRAVENOUS CONTINUOUS
Status: DISCONTINUED | OUTPATIENT
Start: 2023-06-22 | End: 2023-06-23 | Stop reason: HOSPADM

## 2023-06-22 RX ORDER — KETOROLAC TROMETHAMINE 30 MG/ML
30 INJECTION, SOLUTION INTRAMUSCULAR; INTRAVENOUS
Status: DISCONTINUED | OUTPATIENT
Start: 2023-06-22 | End: 2023-06-25 | Stop reason: HOSPADM

## 2023-06-22 RX ORDER — OXYTOCIN 10 [USP'U]/ML
10 INJECTION, SOLUTION INTRAMUSCULAR; INTRAVENOUS
Status: DISCONTINUED | OUTPATIENT
Start: 2023-06-22 | End: 2023-06-23 | Stop reason: HOSPADM

## 2023-06-22 RX ORDER — LABETALOL HYDROCHLORIDE 5 MG/ML
20 INJECTION, SOLUTION INTRAVENOUS
Status: DISCONTINUED | OUTPATIENT
Start: 2023-06-22 | End: 2023-06-25 | Stop reason: HOSPADM

## 2023-06-22 RX ORDER — ONDANSETRON 4 MG/1
4 TABLET, ORALLY DISINTEGRATING ORAL EVERY 6 HOURS PRN
Status: DISCONTINUED | OUTPATIENT
Start: 2023-06-22 | End: 2023-06-23 | Stop reason: HOSPADM

## 2023-06-22 RX ORDER — CARBOPROST TROMETHAMINE 250 UG/ML
250 INJECTION, SOLUTION INTRAMUSCULAR
Status: DISCONTINUED | OUTPATIENT
Start: 2023-06-22 | End: 2023-06-23 | Stop reason: HOSPADM

## 2023-06-22 RX ORDER — PROCHLORPERAZINE 25 MG
25 SUPPOSITORY, RECTAL RECTAL EVERY 12 HOURS PRN
Status: DISCONTINUED | OUTPATIENT
Start: 2023-06-22 | End: 2023-06-23 | Stop reason: HOSPADM

## 2023-06-22 RX ORDER — NALOXONE HYDROCHLORIDE 0.4 MG/ML
0.2 INJECTION, SOLUTION INTRAMUSCULAR; INTRAVENOUS; SUBCUTANEOUS
Status: DISCONTINUED | OUTPATIENT
Start: 2023-06-22 | End: 2023-06-23 | Stop reason: HOSPADM

## 2023-06-22 RX ORDER — MORPHINE SULFATE 10 MG/ML
15 INJECTION, SOLUTION INTRAMUSCULAR; INTRAVENOUS
Status: DISCONTINUED | OUTPATIENT
Start: 2023-06-22 | End: 2023-06-23 | Stop reason: HOSPADM

## 2023-06-22 RX ORDER — OXYTOCIN 10 [USP'U]/ML
10 INJECTION, SOLUTION INTRAMUSCULAR; INTRAVENOUS
Status: DISCONTINUED | OUTPATIENT
Start: 2023-06-22 | End: 2023-06-25 | Stop reason: HOSPADM

## 2023-06-22 RX ORDER — NALOXONE HYDROCHLORIDE 0.4 MG/ML
0.4 INJECTION, SOLUTION INTRAMUSCULAR; INTRAVENOUS; SUBCUTANEOUS
Status: DISCONTINUED | OUTPATIENT
Start: 2023-06-22 | End: 2023-06-23 | Stop reason: HOSPADM

## 2023-06-22 RX ORDER — CITRIC ACID/SODIUM CITRATE 334-500MG
30 SOLUTION, ORAL ORAL
Status: DISCONTINUED | OUTPATIENT
Start: 2023-06-22 | End: 2023-06-23 | Stop reason: HOSPADM

## 2023-06-22 RX ORDER — OMEPRAZOLE 10 MG/1
20 CAPSULE, DELAYED RELEASE ORAL DAILY
COMMUNITY

## 2023-06-22 RX ORDER — NIFEDIPINE 10 MG/1
10-20 CAPSULE ORAL
Status: DISCONTINUED | OUTPATIENT
Start: 2023-06-22 | End: 2023-06-25 | Stop reason: HOSPADM

## 2023-06-22 RX ORDER — OXYTOCIN/0.9 % SODIUM CHLORIDE 30/500 ML
340 PLASTIC BAG, INJECTION (ML) INTRAVENOUS CONTINUOUS PRN
Status: COMPLETED | OUTPATIENT
Start: 2023-06-22 | End: 2023-06-23

## 2023-06-22 RX ORDER — MISOPROSTOL 200 UG/1
800 TABLET ORAL
Status: DISCONTINUED | OUTPATIENT
Start: 2023-06-22 | End: 2023-06-23 | Stop reason: HOSPADM

## 2023-06-22 RX ORDER — MISOPROSTOL 200 UG/1
400 TABLET ORAL
Status: DISCONTINUED | OUTPATIENT
Start: 2023-06-22 | End: 2023-06-23 | Stop reason: HOSPADM

## 2023-06-22 RX ORDER — HYDROXYZINE HYDROCHLORIDE 25 MG/1
50 TABLET, FILM COATED ORAL
Status: DISCONTINUED | OUTPATIENT
Start: 2023-06-22 | End: 2023-06-23 | Stop reason: HOSPADM

## 2023-06-22 RX ORDER — METOCLOPRAMIDE 10 MG/1
10 TABLET ORAL EVERY 6 HOURS PRN
Status: DISCONTINUED | OUTPATIENT
Start: 2023-06-22 | End: 2023-06-23 | Stop reason: HOSPADM

## 2023-06-22 RX ORDER — METHYLERGONOVINE MALEATE 0.2 MG/ML
200 INJECTION INTRAVENOUS
Status: DISCONTINUED | OUTPATIENT
Start: 2023-06-22 | End: 2023-06-23 | Stop reason: HOSPADM

## 2023-06-22 RX ORDER — ACETAMINOPHEN 325 MG/1
975 TABLET ORAL EVERY 6 HOURS PRN
Status: DISCONTINUED | OUTPATIENT
Start: 2023-06-22 | End: 2023-06-25 | Stop reason: HOSPADM

## 2023-06-22 RX ORDER — ONDANSETRON 2 MG/ML
4 INJECTION INTRAMUSCULAR; INTRAVENOUS EVERY 6 HOURS PRN
Status: DISCONTINUED | OUTPATIENT
Start: 2023-06-22 | End: 2023-06-23 | Stop reason: HOSPADM

## 2023-06-22 RX ORDER — OXYTOCIN/0.9 % SODIUM CHLORIDE 30/500 ML
100-340 PLASTIC BAG, INJECTION (ML) INTRAVENOUS CONTINUOUS PRN
Status: DISCONTINUED | OUTPATIENT
Start: 2023-06-22 | End: 2023-06-25 | Stop reason: HOSPADM

## 2023-06-22 RX ORDER — TERBUTALINE SULFATE 1 MG/ML
0.25 INJECTION, SOLUTION SUBCUTANEOUS
Status: DISCONTINUED | OUTPATIENT
Start: 2023-06-22 | End: 2023-06-23 | Stop reason: HOSPADM

## 2023-06-22 RX ORDER — LIDOCAINE 40 MG/G
CREAM TOPICAL
Status: DISCONTINUED | OUTPATIENT
Start: 2023-06-22 | End: 2023-06-23 | Stop reason: HOSPADM

## 2023-06-22 RX ORDER — METOCLOPRAMIDE HYDROCHLORIDE 5 MG/ML
10 INJECTION INTRAMUSCULAR; INTRAVENOUS EVERY 6 HOURS PRN
Status: DISCONTINUED | OUTPATIENT
Start: 2023-06-22 | End: 2023-06-23 | Stop reason: HOSPADM

## 2023-06-22 RX ORDER — PROCHLORPERAZINE MALEATE 10 MG
10 TABLET ORAL EVERY 6 HOURS PRN
Status: DISCONTINUED | OUTPATIENT
Start: 2023-06-22 | End: 2023-06-23 | Stop reason: HOSPADM

## 2023-06-22 RX ORDER — LIDOCAINE 40 MG/G
CREAM TOPICAL
Status: DISCONTINUED | OUTPATIENT
Start: 2023-06-22 | End: 2023-06-22 | Stop reason: HOSPADM

## 2023-06-22 RX ORDER — MISOPROSTOL 100 UG/1
25 TABLET ORAL
Status: DISCONTINUED | OUTPATIENT
Start: 2023-06-22 | End: 2023-06-23 | Stop reason: HOSPADM

## 2023-06-22 RX ORDER — IBUPROFEN 800 MG/1
800 TABLET, FILM COATED ORAL
Status: DISCONTINUED | OUTPATIENT
Start: 2023-06-22 | End: 2023-06-25 | Stop reason: HOSPADM

## 2023-06-22 RX ADMIN — ACETAMINOPHEN 975 MG: 325 TABLET ORAL at 17:35

## 2023-06-22 RX ADMIN — MISOPROSTOL 25 MCG: 100 TABLET ORAL at 09:16

## 2023-06-22 RX ADMIN — ACETAMINOPHEN 975 MG: 325 TABLET ORAL at 11:17

## 2023-06-22 RX ADMIN — MISOPROSTOL 25 MCG: 100 TABLET ORAL at 15:23

## 2023-06-22 RX ADMIN — MISOPROSTOL 25 MCG: 100 TABLET ORAL at 13:21

## 2023-06-22 RX ADMIN — HYDROXYZINE HYDROCHLORIDE 50 MG: 25 TABLET ORAL at 22:47

## 2023-06-22 RX ADMIN — MISOPROSTOL 25 MCG: 100 TABLET ORAL at 17:35

## 2023-06-22 RX ADMIN — ONDANSETRON 4 MG: 2 INJECTION INTRAMUSCULAR; INTRAVENOUS at 21:56

## 2023-06-22 RX ADMIN — MISOPROSTOL 25 MCG: 100 TABLET ORAL at 22:47

## 2023-06-22 RX ADMIN — MISOPROSTOL 25 MCG: 100 TABLET ORAL at 11:17

## 2023-06-22 ASSESSMENT — ACTIVITIES OF DAILY LIVING (ADL)
VISION_MANAGEMENT: CONTACTS
ADLS_ACUITY_SCORE: 20
FALL_HISTORY_WITHIN_LAST_SIX_MONTHS: NO
ADLS_ACUITY_SCORE: 20
TOILETING_ISSUES: NO
ADLS_ACUITY_SCORE: 20
WALKING_OR_CLIMBING_STAIRS_DIFFICULTY: NO
ADLS_ACUITY_SCORE: 20
CONCENTRATING,_REMEMBERING_OR_MAKING_DECISIONS_DIFFICULTY: NO
DRESSING/BATHING_DIFFICULTY: NO
ADLS_ACUITY_SCORE: 20
DIFFICULTY_EATING/SWALLOWING: NO
CHANGE_IN_FUNCTIONAL_STATUS_SINCE_ONSET_OF_CURRENT_ILLNESS/INJURY: NO
ADLS_ACUITY_SCORE: 20
WEAR_GLASSES_OR_BLIND: YES
DOING_ERRANDS_INDEPENDENTLY_DIFFICULTY: NO
ADLS_ACUITY_SCORE: 20
ADLS_ACUITY_SCORE: 20

## 2023-06-22 NOTE — PROGRESS NOTES
Pt  here for IOL at 37w for GHTN. SVE on admission is 3. Plan for oral cytotec. Discussed with pt and she is agreeable to plan. VSS. FHR category 1.

## 2023-06-22 NOTE — CONSULTS
Integrative Therapy Consult    Healing PresenceYes  Essential Oils: Topical (EO/Topical Oil)     Labor Massage Oil - HC       Healing Music:       Breathwork:       Guided Imagery:       Acupressure: Hands on Li4     Oshibori:       Energy Therapy:       Healing Touch:       Reiki:       Qi Gong:     Massage: Hand, Foot, Targeted massage      Targeted Massage: Legs  Sleep Promotion:       Other Therapy:       Intervention Reason: Labor     Pre and Post Session Scores: Patient Desires Treatment: yes                             Delivery:         Referrals:      Ange L Born    Pt would  Like to try acupuncture

## 2023-06-23 ENCOUNTER — ANESTHESIA (OUTPATIENT)
Dept: OBGYN | Facility: CLINIC | Age: 33
End: 2023-06-23
Payer: COMMERCIAL

## 2023-06-23 ENCOUNTER — ANESTHESIA EVENT (OUTPATIENT)
Dept: OBGYN | Facility: CLINIC | Age: 33
End: 2023-06-23
Payer: COMMERCIAL

## 2023-06-23 LAB
ALT SERPL W P-5'-P-CCNC: <9 U/L (ref 0–45)
APTT PPP: 29 SECONDS (ref 22–38)
AST SERPL W P-5'-P-CCNC: 9 U/L (ref 0–40)
CREAT SERPL-MCNC: 0.58 MG/DL (ref 0.6–1.1)
GFR SERPL CREATININE-BSD FRML MDRD: >90 ML/MIN/1.73M2
HGB BLD-MCNC: 11.3 G/DL (ref 11.7–15.7)
INR PPP: 0.99 (ref 0.85–1.15)
PLATELET # BLD AUTO: 236 10E3/UL (ref 150–450)

## 2023-06-23 PROCEDURE — 00HU33Z INSERTION OF INFUSION DEVICE INTO SPINAL CANAL, PERCUTANEOUS APPROACH: ICD-10-PCS | Performed by: ANESTHESIOLOGY

## 2023-06-23 PROCEDURE — 36415 COLL VENOUS BLD VENIPUNCTURE: CPT | Performed by: OBSTETRICS & GYNECOLOGY

## 2023-06-23 PROCEDURE — 258N000003 HC RX IP 258 OP 636: Performed by: OBSTETRICS & GYNECOLOGY

## 2023-06-23 PROCEDURE — 370N000003 HC ANESTHESIA WARD SERVICE: Performed by: ANESTHESIOLOGY

## 2023-06-23 PROCEDURE — 85049 AUTOMATED PLATELET COUNT: CPT | Performed by: OBSTETRICS & GYNECOLOGY

## 2023-06-23 PROCEDURE — 250N000013 HC RX MED GY IP 250 OP 250 PS 637: Performed by: OBSTETRICS & GYNECOLOGY

## 2023-06-23 PROCEDURE — 85018 HEMOGLOBIN: CPT | Performed by: OBSTETRICS & GYNECOLOGY

## 2023-06-23 PROCEDURE — 250N000009 HC RX 250: Performed by: OBSTETRICS & GYNECOLOGY

## 2023-06-23 PROCEDURE — 84460 ALANINE AMINO (ALT) (SGPT): CPT | Performed by: OBSTETRICS & GYNECOLOGY

## 2023-06-23 PROCEDURE — 82565 ASSAY OF CREATININE: CPT | Performed by: OBSTETRICS & GYNECOLOGY

## 2023-06-23 PROCEDURE — 120N000001 HC R&B MED SURG/OB

## 2023-06-23 PROCEDURE — 250N000013 HC RX MED GY IP 250 OP 250 PS 637

## 2023-06-23 PROCEDURE — 3E0R3BZ INTRODUCTION OF ANESTHETIC AGENT INTO SPINAL CANAL, PERCUTANEOUS APPROACH: ICD-10-PCS | Performed by: ANESTHESIOLOGY

## 2023-06-23 PROCEDURE — 0HQ9XZZ REPAIR PERINEUM SKIN, EXTERNAL APPROACH: ICD-10-PCS | Performed by: OBSTETRICS & GYNECOLOGY

## 2023-06-23 PROCEDURE — 10907ZC DRAINAGE OF AMNIOTIC FLUID, THERAPEUTIC FROM PRODUCTS OF CONCEPTION, VIA NATURAL OR ARTIFICIAL OPENING: ICD-10-PCS | Performed by: OBSTETRICS & GYNECOLOGY

## 2023-06-23 PROCEDURE — 84450 TRANSFERASE (AST) (SGOT): CPT | Performed by: OBSTETRICS & GYNECOLOGY

## 2023-06-23 PROCEDURE — 722N000001 HC LABOR CARE VAGINAL DELIVERY SINGLE

## 2023-06-23 PROCEDURE — 85610 PROTHROMBIN TIME: CPT | Performed by: OBSTETRICS & GYNECOLOGY

## 2023-06-23 PROCEDURE — 85730 THROMBOPLASTIN TIME PARTIAL: CPT | Performed by: OBSTETRICS & GYNECOLOGY

## 2023-06-23 PROCEDURE — 250N000011 HC RX IP 250 OP 636: Performed by: ANESTHESIOLOGY

## 2023-06-23 RX ORDER — MISOPROSTOL 200 UG/1
800 TABLET ORAL
Status: DISCONTINUED | OUTPATIENT
Start: 2023-06-23 | End: 2023-06-25 | Stop reason: HOSPADM

## 2023-06-23 RX ORDER — DOCUSATE SODIUM 100 MG/1
100 CAPSULE, LIQUID FILLED ORAL DAILY
Status: DISCONTINUED | OUTPATIENT
Start: 2023-06-24 | End: 2023-06-24

## 2023-06-23 RX ORDER — SODIUM CHLORIDE, SODIUM LACTATE, POTASSIUM CHLORIDE, CALCIUM CHLORIDE 600; 310; 30; 20 MG/100ML; MG/100ML; MG/100ML; MG/100ML
INJECTION, SOLUTION INTRAVENOUS CONTINUOUS PRN
Status: DISCONTINUED | OUTPATIENT
Start: 2023-06-23 | End: 2023-06-23 | Stop reason: HOSPADM

## 2023-06-23 RX ORDER — ACETAMINOPHEN 325 MG/1
650 TABLET ORAL EVERY 4 HOURS PRN
Status: DISCONTINUED | OUTPATIENT
Start: 2023-06-23 | End: 2023-06-25 | Stop reason: HOSPADM

## 2023-06-23 RX ORDER — TERBUTALINE SULFATE 1 MG/ML
0.25 INJECTION, SOLUTION SUBCUTANEOUS
Status: DISCONTINUED | OUTPATIENT
Start: 2023-06-23 | End: 2023-06-23 | Stop reason: HOSPADM

## 2023-06-23 RX ORDER — METHYLERGONOVINE MALEATE 0.2 MG/ML
200 INJECTION INTRAVENOUS
Status: DISCONTINUED | OUTPATIENT
Start: 2023-06-23 | End: 2023-06-25 | Stop reason: HOSPADM

## 2023-06-23 RX ORDER — MISOPROSTOL 200 UG/1
400 TABLET ORAL
Status: DISCONTINUED | OUTPATIENT
Start: 2023-06-23 | End: 2023-06-25 | Stop reason: HOSPADM

## 2023-06-23 RX ORDER — EPHEDRINE SULFATE 50 MG/ML
5 INJECTION, SOLUTION INTRAMUSCULAR; INTRAVENOUS; SUBCUTANEOUS
Status: DISCONTINUED | OUTPATIENT
Start: 2023-06-23 | End: 2023-06-23 | Stop reason: HOSPADM

## 2023-06-23 RX ORDER — NALBUPHINE HYDROCHLORIDE 10 MG/ML
2.5-5 INJECTION, SOLUTION INTRAMUSCULAR; INTRAVENOUS; SUBCUTANEOUS EVERY 6 HOURS PRN
Status: DISCONTINUED | OUTPATIENT
Start: 2023-06-23 | End: 2023-06-25 | Stop reason: HOSPADM

## 2023-06-23 RX ORDER — MODIFIED LANOLIN
OINTMENT (GRAM) TOPICAL
Status: DISCONTINUED | OUTPATIENT
Start: 2023-06-23 | End: 2023-06-25 | Stop reason: HOSPADM

## 2023-06-23 RX ORDER — CARBOPROST TROMETHAMINE 250 UG/ML
250 INJECTION, SOLUTION INTRAMUSCULAR
Status: DISCONTINUED | OUTPATIENT
Start: 2023-06-23 | End: 2023-06-25 | Stop reason: HOSPADM

## 2023-06-23 RX ORDER — BISACODYL 10 MG
10 SUPPOSITORY, RECTAL RECTAL DAILY PRN
Status: DISCONTINUED | OUTPATIENT
Start: 2023-06-23 | End: 2023-06-25 | Stop reason: HOSPADM

## 2023-06-23 RX ORDER — IBUPROFEN 800 MG/1
800 TABLET, FILM COATED ORAL EVERY 6 HOURS PRN
Status: DISCONTINUED | OUTPATIENT
Start: 2023-06-23 | End: 2023-06-25 | Stop reason: HOSPADM

## 2023-06-23 RX ORDER — OXYTOCIN 10 [USP'U]/ML
10 INJECTION, SOLUTION INTRAMUSCULAR; INTRAVENOUS
Status: DISCONTINUED | OUTPATIENT
Start: 2023-06-23 | End: 2023-06-25 | Stop reason: HOSPADM

## 2023-06-23 RX ORDER — LIDOCAINE 40 MG/G
CREAM TOPICAL
Status: DISCONTINUED | OUTPATIENT
Start: 2023-06-23 | End: 2023-06-23 | Stop reason: HOSPADM

## 2023-06-23 RX ORDER — OXYTOCIN/0.9 % SODIUM CHLORIDE 30/500 ML
1-24 PLASTIC BAG, INJECTION (ML) INTRAVENOUS CONTINUOUS
Status: DISCONTINUED | OUTPATIENT
Start: 2023-06-23 | End: 2023-06-23 | Stop reason: HOSPADM

## 2023-06-23 RX ORDER — OXYTOCIN/0.9 % SODIUM CHLORIDE 30/500 ML
340 PLASTIC BAG, INJECTION (ML) INTRAVENOUS CONTINUOUS PRN
Status: DISCONTINUED | OUTPATIENT
Start: 2023-06-23 | End: 2023-06-25 | Stop reason: HOSPADM

## 2023-06-23 RX ORDER — HYDROCORTISONE 25 MG/G
CREAM TOPICAL 3 TIMES DAILY PRN
Status: DISCONTINUED | OUTPATIENT
Start: 2023-06-23 | End: 2023-06-25 | Stop reason: HOSPADM

## 2023-06-23 RX ORDER — FENTANYL/ROPIVACAINE/NS/PF 2MCG/ML-.1
PLASTIC BAG, INJECTION (ML) EPIDURAL
Status: DISCONTINUED | OUTPATIENT
Start: 2023-06-23 | End: 2023-06-23 | Stop reason: HOSPADM

## 2023-06-23 RX ADMIN — Medication 2 MILLI-UNITS/MIN: at 08:11

## 2023-06-23 RX ADMIN — MISOPROSTOL 25 MCG: 100 TABLET ORAL at 05:45

## 2023-06-23 RX ADMIN — SODIUM CHLORIDE, POTASSIUM CHLORIDE, SODIUM LACTATE AND CALCIUM CHLORIDE: 600; 310; 30; 20 INJECTION, SOLUTION INTRAVENOUS at 08:07

## 2023-06-23 RX ADMIN — MISOPROSTOL 25 MCG: 100 TABLET ORAL at 01:07

## 2023-06-23 RX ADMIN — Medication 340 ML/HR: at 20:55

## 2023-06-23 RX ADMIN — FLUOXETINE 40 MG: 20 CAPSULE ORAL at 17:48

## 2023-06-23 RX ADMIN — MISOPROSTOL 25 MCG: 100 TABLET ORAL at 03:39

## 2023-06-23 RX ADMIN — Medication: at 18:24

## 2023-06-23 RX ADMIN — ACETAMINOPHEN 975 MG: 325 TABLET ORAL at 15:31

## 2023-06-23 ASSESSMENT — ACTIVITIES OF DAILY LIVING (ADL)
ADLS_ACUITY_SCORE: 20
ADLS_ACUITY_SCORE: 24
ADLS_ACUITY_SCORE: 20

## 2023-06-23 NOTE — PLAN OF CARE
Goal Outcome Evaluation:      Problem: Plan of Care - These are the overarching goals to be used throughout the patient stay.    Goal: Optimal Comfort and Wellbeing  Outcome: Progressing  Intervention: Provide Person-Centered Care  Recent Flowsheet Documentation  Taken 6/23/2023 0400 by Guera Sterling, RN    VSS Received hydroxyzine overnight for sleep. Cytotec was given overnight. Planning to switch to pitocin this morning. Will continue to monitor.

## 2023-06-23 NOTE — ANESTHESIA PROCEDURE NOTES
"Epidural catheter Procedure Note    Pre-Procedure   Staff -        Anesthesiologist:  Rober Valdivia MD       Performed By: anesthesiologist       Location: OB       Pre-Anesthestic Checklist: patient identified, IV checked, site marked, risks and benefits discussed, informed consent, monitors and equipment checked and pre-op evaluation  Timeout:       Correct Patient: Yes        Correct Procedure: Yes        Correct Site: Yes        Correct Position: Yes   Procedure Documentation  Procedure: epidural catheter       Patient Position: sitting       Skin prep: Chloraprep       Local skin infiltrated with 3 mL of 2% lidocaine.        Insertion Site: L3-4. (midline approach).       Technique: LORT saline        EDMAR at 7 cm.       Needle Type: [employer]       Needle Gauge: 18.        Needle Length (Inches): 3.5        Catheter: 19 G.          Catheter threaded easily.           Threaded 12 cm at skin.         # of attempts: 1 and  # of redirects:  0    Assessment/Narrative         Paresthesias: No.       Test dose of 3 mL lidocaine 1.5% w/ 1:200,000 epinephrine at 18:29 CDT.         Test dose negative, 3 minutes after injection, for signs of intravascular, subdural, or intrathecal injection.       Insertion/Infusion Method: LORT saline       Aspiration negative for Heme or CSF via Epidural Catheter.    Medication(s) Administered   0.125% Bupivacaine + 2 mcg/mL Fentanyl via CADD (Epidural) - EPIDURAL   8 mL - 6/23/2023 6:33:00 PM   Comments:  Risks, benefits, and procedure discussed with Pt and there was agreement to proceed.  Site prepped and draped, sterile technique.  Negative CSF/heme via needle.  Catheter advanced without resistance.  Negative aspiration of catheter prior to negative test dose.  No apparent complications.  Single pass with needle.  Pt tolerated the procedure well.      FOR [facility] ([facility]) ONLY:   Pain Team Contact information: please page the Pain Team Via [employer]. Search \"Pain\". During " daytime hours, please page the attending first. At night please page the resident first.

## 2023-06-23 NOTE — PROVIDER NOTIFICATION
06/23/23 1843 06/23/23 1845   Vital Signs   Oximeter Heart Rate 86 bpm 85 bpm   /73 (!) 140/71         Dr. Reyes notified of blood pressures listed above.  Continue to monitor

## 2023-06-23 NOTE — ANESTHESIA PREPROCEDURE EVALUATION
Anesthesia Pre-Procedure Evaluation    Patient: Ana Deng   MRN: [id number] : 1990        Procedure :           History reviewed. No pertinent past medical history.   Past Surgical History:   Procedure Laterality Date     CYST REMOVAL  2009     MAMMOPLASTY REDUCTION Bilateral 2021     WISDOM TOOTH EXTRACTION  2007      Allergies   Allergen Reactions     Latex Rash     Other reaction(s): red skin  Local reaction only       Latex      Sertraline Dizziness      Social History     Tobacco Use     Smoking status: Never     Smokeless tobacco: Never   Substance Use Topics     Alcohol use: No      Wt Readings from Last 1 Encounters:   23 132.9 kg (293 lb)        Anesthesia Evaluation   Pt has had prior anesthetic. Type: OB Labor Epidural.        ROS/MED HX  ENT/Pulmonary:  - neg pulmonary ROS     Neurologic:  - neg neurologic ROS     Cardiovascular:  - neg cardiovascular ROS     METS/Exercise Tolerance:     Hematologic:  - neg hematologic  ROS     Musculoskeletal:       GI/Hepatic:  - neg GI/hepatic ROS     Renal/Genitourinary:       Endo:     (+) Obesity,     Psychiatric/Substance Use:  - neg psychiatric ROS     Infectious Disease:       Malignancy:       Other:            Physical Exam    Airway        Mallampati: II       Respiratory Devices and Support         Dental  no notable dental history         Cardiovascular   cardiovascular exam normal          Pulmonary   pulmonary exam normal                OUTSIDE LABS:  CBC:   Lab Results   Component Value Date    WBC 9.3 2023    WBC 11.0 2023    HGB 11.3 (L) 2023    HGB 10.9 (L) 2023    HCT 33.2 (L) 2023    HCT 36.9 2023     2023     2023     BMP:   Lab Results   Component Value Date     2023     2023    POTASSIUM 4.1 2023    POTASSIUM 4.3 2023    CHLORIDE 108 (H) 2023    CHLORIDE 104 2023    CO2 22 2023    CO2 21 (L)  06/16/2023    BUN 6 (L) 06/22/2023    BUN 7.7 06/16/2023    CR 0.58 (L) 06/23/2023    CR 0.59 (L) 06/22/2023    GLC 98 06/22/2023    GLC 94 06/16/2023     COAGS:   Lab Results   Component Value Date    PTT 29 06/23/2023    INR 0.99 06/23/2023     POC: No results found for: BGM, HCG, HCGS  HEPATIC:   Lab Results   Component Value Date    ALBUMIN 2.6 (L) 06/22/2023    PROTTOTAL 6.3 06/22/2023    ALT <9 06/23/2023    AST 9 06/23/2023    ALKPHOS 187 (H) 06/22/2023    BILITOTAL 0.2 06/22/2023     OTHER:   Lab Results   Component Value Date    KARY 9.1 06/22/2023       Anesthesia Plan    ASA Status:  3      Anesthesia Type: Epidural.              Consents    Anesthesia Plan(s) and associated risks, benefits, and realistic alternatives discussed. Questions answered and patient/representative(s) expressed understanding.    - Discussed:     - Discussed with:  Patient         Postoperative Care            Comments:           neg OB ROS.       Rober Valdivia MD

## 2023-06-23 NOTE — H&P
OB ADMISSION     Date: 2023  NAME: Chanell Deng  : 1990  MRN: [id number]     CC: I am here for my induction     HPI: Chanell Deng is a 33 year old female,  with  single inter-uterine gestation at 37w0d, with Estimated Date of Delivery: 2023. She presented to L&D with intent to start her induction secondary to PIH .  Pregnancy has been complicated by PIH. Patient denies headache, visual changes, RUQ pain. She reports good fetal movement.    OB HISTORY   OB History    Para Term  AB Living   4 2 2 0 1 2   SAB IAB Ectopic Multiple Live Births   1 0 0 0 2      # Outcome Date GA Lbr Killian/2nd Weight Sex Delivery Anes PTL Lv   4 Current            3 Term 18 39w1d 02:55 / 00:10 3.43 kg (7 lb 9 oz) F  Local, IV N JOCELYN      Name: YOEL DENG-CHANELL      Apgar1: 8  Apgar5: 9   2 Term 14 40w1d 13:15 / 00:32  F Vag-Spont EPI N JOCELYN      Name: YOEL DENG-CHANELL      Apgar1: 8  Apgar5: 9   1 SAB                PAST MEDICAL HISTORY:  History reviewed. No pertinent past medical history.     PAST SURGICAL HISTORY:   Past Surgical History:   Procedure Laterality Date     CYST REMOVAL  2009     MAMMOPLASTY REDUCTION Bilateral 2021     WISDOM TOOTH EXTRACTION  2007        SOCIAL HISTORY  Reviewed, patient denies smoking, alcohol and drug use  She is  . Father is  involved    MEDICATIONS  Current Facility-Administered Medications   Medication     acetaminophen (TYLENOL) tablet 975 mg     carboprost (HEMABATE) injection 250 mcg     hydrOXYzine (ATARAX) tablet 50 mg     ketorolac (TORADOL) injection 30 mg    Or     ketorolac (TORADOL) injection 30 mg    Or     ibuprofen (ADVIL/MOTRIN) tablet 800 mg     labetalol (NORMODYNE/TRANDATE) injection 20 mg     lactated ringers BOLUS 1,000 mL    Or     lactated ringers BOLUS 500 mL     lactated ringers infusion     lidocaine (LMX4) cream     lidocaine 1 % 0.1-1 mL     lidocaine 1 % 0.1-20 mL     Medication Instructions -  "cervical ripening and induction medications     methylergonovine (METHERGINE) injection 200 mcg     metoclopramide (REGLAN) injection 10 mg    Or     metoclopramide (REGLAN) tablet 10 mg     misoprostol (cervical ripening) (CYTOTEC) quarter-tab 25 mcg     misoprostol (CYTOTEC) tablet 400 mcg    Or     misoprostol (CYTOTEC) tablet 800 mcg     Morphine Sulfate (PF) injection 15 mg     naloxone (NARCAN) injection 0.2 mg    Or     naloxone (NARCAN) injection 0.4 mg    Or     naloxone (NARCAN) injection 0.2 mg    Or     naloxone (NARCAN) injection 0.4 mg     NIFEdipine (PROCARDIA) capsule 10-20 mg     ondansetron (ZOFRAN ODT) ODT tab 4 mg    Or     ondansetron (ZOFRAN) injection 4 mg     oxytocin (PITOCIN) 30 units in 500 mL 0.9% NaCl infusion     oxytocin (PITOCIN) 30 units in 500 mL 0.9% NaCl infusion     oxytocin (PITOCIN) injection 10 Units     oxytocin (PITOCIN) injection 10 Units     prochlorperazine (COMPAZINE) injection 10 mg    Or     prochlorperazine (COMPAZINE) tablet 10 mg    Or     prochlorperazine (COMPAZINE) suppository 25 mg     sodium chloride (PF) 0.9% PF flush 3 mL     sodium chloride (PF) 0.9% PF flush 3 mL     sodium citrate-citric acid (BICITRA) solution 30 mL     terbutaline (BRETHINE) injection 0.25 mg     tranexamic acid (CYKLOKAPRON) bolus 1 g vial attach to NaCl 50 or 100 mL bag ADULT       ALLERGIES  Allergies   Allergen Reactions     Latex Rash     Other reaction(s): red skin  Local reaction only       Latex      Sertraline Dizziness       ROS: otherwise negative except what is stated in HPI.     PHYSICAL EXAM   BP (!) 144/65   Temp 98  F (36.7  C) (Oral)   Resp 16   Ht 1.575 m (5' 2\")   Wt 132.9 kg (293 lb)   LMP 10/06/2022   BMI 53.59 kg/m     Gen: no acute distress, resting comfortably   CV: acyanotic   Heart: regular rate and rhythm   Pulm: unlabored respirations, clear to ausculation bilaterally    Abd: gravid, soft, nontender   Cervix: 2/60/-2  Extremities: soft, nontender   FHR: " positive, category 1  [place]: regular contractions      LABS  @LABRSLTOB(ABORH EXT,LN-ABORH,HML ABO/RH,HGB EXT,HGB,RUBELLA EXT,LN-RUBELLA IGG ANTIBODY:Last:1)@     IMPRESSION  33 year old [unfilled] at 37w0d   single inter uterine pregnancy at term   Pregnancy complications include: PIH  being induced secondary to PIH         PLAN  - Admit to hospital   - Proceed towards delivery   - induction with Cytotec, - epidural if patient desires and - Anticipate normal spontaneous vaginal delivery    Jairo Lyons MD

## 2023-06-23 NOTE — PROGRESS NOTES
Patient completed 9 doses of cytotec.  Patients plan of care changed this morning at shift change.  Pitocin started at 0810.  Pitocin currently at 24 iqra/units.   Plan to turn off at 815.  SVE to be done.  Notify Dr. Reyes. Plan of care will be determined.  Epidural placed at 1845. Category 1 FHR tracing.     Fawn Rasheed RN

## 2023-06-23 NOTE — CONSULTS
"ACUPUNCTURIST TREATMENT NOTE    Name: Ana Deng  :  1990  MRN:  [id number]    Acupuncture Treatment  Patient Type: Maternity  Intervention Reason: Desires Experience  Patient complaint:: Labor support  Initial insertions: Gb 21, Li 4, Sp 6, Bl 67  Number of needles inserted: 8  Number of needles removed: 8         \"Risks and benefits of acupuncture were discussed with patient. Consent for treatment was given. We thank you for the referral.\"     Rashaad Joyner    Date:  2023  Time:  11:18 AM    "

## 2023-06-24 LAB
ALT SERPL W P-5'-P-CCNC: <9 U/L (ref 0–45)
APTT PPP: 29 SECONDS (ref 22–38)
AST SERPL W P-5'-P-CCNC: 11 U/L (ref 0–40)
CREAT SERPL-MCNC: 0.58 MG/DL (ref 0.6–1.1)
GFR SERPL CREATININE-BSD FRML MDRD: >90 ML/MIN/1.73M2
HGB BLD-MCNC: 10 G/DL (ref 11.7–15.7)
INR PPP: 1.01 (ref 0.85–1.15)
PLATELET # BLD AUTO: 215 10E3/UL (ref 150–450)

## 2023-06-24 PROCEDURE — 250N000013 HC RX MED GY IP 250 OP 250 PS 637: Performed by: OBSTETRICS & GYNECOLOGY

## 2023-06-24 PROCEDURE — 36415 COLL VENOUS BLD VENIPUNCTURE: CPT | Performed by: OBSTETRICS & GYNECOLOGY

## 2023-06-24 PROCEDURE — 85730 THROMBOPLASTIN TIME PARTIAL: CPT | Performed by: OBSTETRICS & GYNECOLOGY

## 2023-06-24 PROCEDURE — 84450 TRANSFERASE (AST) (SGOT): CPT | Performed by: OBSTETRICS & GYNECOLOGY

## 2023-06-24 PROCEDURE — 85610 PROTHROMBIN TIME: CPT | Performed by: OBSTETRICS & GYNECOLOGY

## 2023-06-24 PROCEDURE — 120N000001 HC R&B MED SURG/OB

## 2023-06-24 PROCEDURE — 84460 ALANINE AMINO (ALT) (SGPT): CPT | Performed by: OBSTETRICS & GYNECOLOGY

## 2023-06-24 PROCEDURE — 82565 ASSAY OF CREATININE: CPT | Performed by: OBSTETRICS & GYNECOLOGY

## 2023-06-24 PROCEDURE — 85018 HEMOGLOBIN: CPT | Performed by: OBSTETRICS & GYNECOLOGY

## 2023-06-24 PROCEDURE — 85049 AUTOMATED PLATELET COUNT: CPT | Performed by: OBSTETRICS & GYNECOLOGY

## 2023-06-24 RX ORDER — DOCUSATE SODIUM 100 MG/1
100 CAPSULE, LIQUID FILLED ORAL 2 TIMES DAILY
Status: DISCONTINUED | OUTPATIENT
Start: 2023-06-24 | End: 2023-06-25 | Stop reason: HOSPADM

## 2023-06-24 RX ORDER — HYDROXYZINE HYDROCHLORIDE 25 MG/1
50 TABLET, FILM COATED ORAL EVERY 6 HOURS PRN
Status: DISCONTINUED | OUTPATIENT
Start: 2023-06-24 | End: 2023-06-25 | Stop reason: HOSPADM

## 2023-06-24 RX ADMIN — FLUOXETINE 40 MG: 20 CAPSULE ORAL at 09:38

## 2023-06-24 RX ADMIN — ACETAMINOPHEN 650 MG: 325 TABLET ORAL at 12:07

## 2023-06-24 RX ADMIN — ACETAMINOPHEN 650 MG: 325 TABLET ORAL at 06:21

## 2023-06-24 RX ADMIN — IBUPROFEN 800 MG: 800 TABLET, FILM COATED ORAL at 05:00

## 2023-06-24 RX ADMIN — DOCUSATE SODIUM 100 MG: 100 CAPSULE, LIQUID FILLED ORAL at 22:50

## 2023-06-24 RX ADMIN — HYDROXYZINE HYDROCHLORIDE 50 MG: 25 TABLET ORAL at 00:50

## 2023-06-24 RX ADMIN — IBUPROFEN 800 MG: 800 TABLET, FILM COATED ORAL at 12:07

## 2023-06-24 RX ADMIN — IBUPROFEN 800 MG: 800 TABLET, FILM COATED ORAL at 18:43

## 2023-06-24 RX ADMIN — DOCUSATE SODIUM 100 MG: 100 CAPSULE, LIQUID FILLED ORAL at 09:38

## 2023-06-24 ASSESSMENT — ACTIVITIES OF DAILY LIVING (ADL)
ADLS_ACUITY_SCORE: 24
ADLS_ACUITY_SCORE: 24
ADLS_ACUITY_SCORE: 20
ADLS_ACUITY_SCORE: 24
ADLS_ACUITY_SCORE: 20
ADLS_ACUITY_SCORE: 24
ADLS_ACUITY_SCORE: 24
ADLS_ACUITY_SCORE: 20
ADLS_ACUITY_SCORE: 24

## 2023-06-24 NOTE — PROGRESS NOTES
Patient doing well.  Voiding adequately. Bonding well with .  Fundus firm. Lochia scant.      Continue to monitor.     Fawn Rasheed RN

## 2023-06-24 NOTE — LACTATION NOTE
This note was copied from a baby's chart.  Referred to Ana to assist with a feeding. nAa has two other children, but had a breast reduction 2 years ago. She has a Spectra pump 2 for home use.    At time of consult, Ana stated that she had just finished a feeding. Because of this, recommendations were discussed but not shown. Resources for home were reviewed including oupt lactation and ECFE.     A Symphony pump was initiated at this time for additional stimulation. No colostrum was seen. Ana was given tips on how to count swallows at the breast when feeding next.    Questions related to breastfeeding and pumping were discussed.

## 2023-06-24 NOTE — PLAN OF CARE
Problem: Postpartum (Vaginal Delivery)  Goal: Successful Maternal Role Transition  Outcome: Progressing  Goal: Hemostasis  Outcome: Progressing  Goal: Absence of Infection Signs and Symptoms  Outcome: Progressing  Goal: Anesthesia/Sedation Recovery  Outcome: Progressing  Goal: Optimal Pain Control and Function  Outcome: Progressing  Intervention: Prevent or Manage Pain  Recent Flowsheet Documentation  Taken 6/24/2023 0500 by Elana Saravia RN  Pain Management Interventions: medication (see MAR)  Perineal Care: cold pack/ice pack applied   Goal Outcome Evaluation:       VSS. C/O uterine cramping. Tylenol and ibuprofen given for pain control. Atarax PRN for itching given x1. Breastfeeding baby, attentive to cues. Fundus midline and firm. Lochia light, no clots reported. Up independently. Voiding spontaneously. Will continue to monitor.

## 2023-06-24 NOTE — PROVIDER NOTIFICATION
Called Dr. Reyes on phone about patient needing something for itching. No longer has IV, does not want a new one put in, can patient have vistaril? Was told oral vistaril 50mg q6h PRN will be added to MAR

## 2023-06-24 NOTE — L&D DELIVERY NOTE
OB Vaginal Delivery Note    Ana Deng MRN# [id number]   Age: 33 year old YOB: 1990       GA: 37w1d  GP:   Labor Complications:    EBL:   mL  Delivery QBL:    Delivery Type: Vaginal, Spontaneous   ROM to Delivery Time: (Delivered) Hours: 7 Minutes: 51  [place] Weight:     1 Minute 5 Minute 10 Minute   Apgar Totals: 8   9             Delivery Details:  Ana Deng, a 33 year old  female delivered a viable infant with apgars of 8  and 9 . Patient was fully dilated and pushing after   hours   minutes in active labor. Delivery was via vaginal, spontaneous  to a sterile field under epidural  anesthesia. Infant delivered in vertex  left  occiput  anterior  position. Anterior and posterior shoulders delivered without difficulty. The cord was clamped, cut twice and   were noted. Cord blood was obtained in routine fashion with the following disposition:  .      Cord complications: none   Placenta delivered at  . Placental disposition was Hospital disposal . Fundal massage performed and fundus found to be firm.     Episiotomy: none    Perineum, vagina, cervix were inspected, and the following lacerations were noted:   Perineal lacerations: 1st                Any lacerations were repaired in the usual fashion using 3-0 vicryl  Excellent hemostasis was noted. Needle count correct. Infant and patient in delivery room in good and stable condition.        Harman Deng-Ana [[id number]]    Labor Event Times    Active labor onset date: 23 Onset time:  7:11 PM CDT   Dilation complete date: 23 Complete time:  8:30 PM   Start pushing date/time: 2023 2043      Labor Events     labor?: No   steroids: None  Labor Type: Induction/Cervical ripening     Antibiotics received during labor?: No     Rupture date/time: 23 1255   Rupture type: Artificial Rupture of Membranes  Fluid color: Clear  Fluid odor: Normal     Induction: Misoprostol  Induction date/time:      Cervical  ripening date/time:      Indications for induction: Gestational Hypertension     Augmentation: AROM     Delivery/Placenta Date and Time    Delivery Date: 6/23/23 Delivery Time:  8:46 PM           Apgars    Living status: Living   1 Minute 5 Minute 10 Minute 15 Minute 20 Minute   Skin color: 0  1       Heart rate: 2  2       Reflex irritability: 2  2       Muscle tone: 2  2       Respiratory effort: 2  2       Total: 8  9       Apgars assigned by: YURI DYE RN     Cord    Cord Complications: None               Stem cell collection?: No       Labor Events and Shoulder Dystocia    Fetal Tracing Prior to Delivery: Category 1  Shoulder dystocia present?: Neg     Delivery (Maternal) (Provider to Complete) (020468)    Episiotomy: None  Perineal lacerations: 1st    Repair suture: 3-0 Rapide  Number of repair packets: 1  Genital tract inspection done: Pos     Blood Loss  Mother: Ana Deng #[id number]   Start of Mother's Information    Delivery Blood Loss  06/23/23 1911 - 06/23/23 2109    None           End of Mother's Information  Mother: Ana Deng #[id number]          Delivery - Provider to Complete ([id number])    Delivery Type (Choose the 1 that will go to the Birth History): Vaginal, Spontaneous                                 Placenta    Removal: Spontaneous  Disposition: Hospital disposal           Anesthesia    Method: Epidural  Cervical dilation at placement: 0-3                Presentation and Position    Presentation: Vertex    Position: Left Occiput Anterior                 Elyssa Reyes MD

## 2023-06-24 NOTE — ANESTHESIA POSTPROCEDURE EVALUATION
Patient: Ana Deng    Procedure: * No procedures listed *       Anesthesia Type:  Epidural    Note:  Disposition: Inpatient   Postop Pain Control: Uneventful            Sign Out: Well controlled pain   PONV: No   Neuro/Psych: Uneventful            Sign Out: Acceptable/Baseline neuro status   Airway/Respiratory: Uneventful            Sign Out: Acceptable/Baseline resp. status   CV/Hemodynamics: Uneventful            Sign Out: Acceptable CV status; No obvious hypovolemia; No obvious fluid overload   Other NRE: NONE   DID A NON-ROUTINE EVENT OCCUR? No           Last vitals:  Vitals:    06/23/23 2300 06/24/23 0001 06/24/23 0440   BP: 117/74 102/65 122/61   Pulse: 93  75   Resp: 18 16 16   Temp: 37.2  C (98.9  F) 37.2  C (99  F)    SpO2: 97% 97% 99%       No apparent complications from labor epidural.    Electronically Signed By: Rober Valdivia MD  June 24, 2023  7:02 AM

## 2023-06-25 VITALS
HEART RATE: 82 BPM | DIASTOLIC BLOOD PRESSURE: 68 MMHG | OXYGEN SATURATION: 98 % | TEMPERATURE: 97.8 F | SYSTOLIC BLOOD PRESSURE: 110 MMHG | WEIGHT: 293 LBS | HEIGHT: 62 IN | RESPIRATION RATE: 16 BRPM | BODY MASS INDEX: 53.92 KG/M2

## 2023-06-25 LAB
ALT SERPL W P-5'-P-CCNC: <9 U/L (ref 0–45)
APTT PPP: 28 SECONDS (ref 22–38)
AST SERPL W P-5'-P-CCNC: 13 U/L (ref 0–40)
CREAT SERPL-MCNC: 0.59 MG/DL (ref 0.6–1.1)
GFR SERPL CREATININE-BSD FRML MDRD: >90 ML/MIN/1.73M2
HGB BLD-MCNC: 10.2 G/DL (ref 11.7–15.7)
INR PPP: 0.86 (ref 0.85–1.15)
PLATELET # BLD AUTO: 248 10E3/UL (ref 150–450)

## 2023-06-25 PROCEDURE — 250N000013 HC RX MED GY IP 250 OP 250 PS 637

## 2023-06-25 PROCEDURE — 84450 TRANSFERASE (AST) (SGOT): CPT | Performed by: OBSTETRICS & GYNECOLOGY

## 2023-06-25 PROCEDURE — 84460 ALANINE AMINO (ALT) (SGPT): CPT | Performed by: OBSTETRICS & GYNECOLOGY

## 2023-06-25 PROCEDURE — 85730 THROMBOPLASTIN TIME PARTIAL: CPT | Performed by: OBSTETRICS & GYNECOLOGY

## 2023-06-25 PROCEDURE — 85049 AUTOMATED PLATELET COUNT: CPT | Performed by: OBSTETRICS & GYNECOLOGY

## 2023-06-25 PROCEDURE — 85610 PROTHROMBIN TIME: CPT | Performed by: OBSTETRICS & GYNECOLOGY

## 2023-06-25 PROCEDURE — 36415 COLL VENOUS BLD VENIPUNCTURE: CPT | Performed by: OBSTETRICS & GYNECOLOGY

## 2023-06-25 PROCEDURE — 250N000013 HC RX MED GY IP 250 OP 250 PS 637: Performed by: OBSTETRICS & GYNECOLOGY

## 2023-06-25 PROCEDURE — 82565 ASSAY OF CREATININE: CPT | Performed by: OBSTETRICS & GYNECOLOGY

## 2023-06-25 PROCEDURE — 85018 HEMOGLOBIN: CPT | Performed by: OBSTETRICS & GYNECOLOGY

## 2023-06-25 RX ORDER — CALCIUM CARBONATE 500 MG/1
500 TABLET, CHEWABLE ORAL DAILY PRN
Status: DISCONTINUED | OUTPATIENT
Start: 2023-06-25 | End: 2023-06-25 | Stop reason: HOSPADM

## 2023-06-25 RX ORDER — IBUPROFEN 200 MG
600 TABLET ORAL EVERY 6 HOURS PRN
COMMUNITY
Start: 2023-06-25

## 2023-06-25 RX ORDER — PANTOPRAZOLE SODIUM 40 MG/1
40 TABLET, DELAYED RELEASE ORAL
Status: DISCONTINUED | OUTPATIENT
Start: 2023-06-25 | End: 2023-06-25 | Stop reason: HOSPADM

## 2023-06-25 RX ORDER — ACETAMINOPHEN 325 MG/1
975 TABLET ORAL EVERY 6 HOURS PRN
COMMUNITY
Start: 2023-06-25

## 2023-06-25 RX ORDER — FLUOXETINE 40 MG/1
40 CAPSULE ORAL DAILY
Qty: 30 CAPSULE | Refills: 0 | Status: SHIPPED | OUTPATIENT
Start: 2023-06-25

## 2023-06-25 RX ADMIN — IBUPROFEN 800 MG: 800 TABLET, FILM COATED ORAL at 09:14

## 2023-06-25 RX ADMIN — CALCIUM CARBONATE (ANTACID) CHEW TAB 500 MG 500 MG: 500 CHEW TAB at 05:26

## 2023-06-25 RX ADMIN — ACETAMINOPHEN 975 MG: 325 TABLET ORAL at 12:48

## 2023-06-25 RX ADMIN — FLUOXETINE 40 MG: 20 CAPSULE ORAL at 09:13

## 2023-06-25 RX ADMIN — PANTOPRAZOLE SODIUM 40 MG: 40 TABLET, DELAYED RELEASE ORAL at 09:13

## 2023-06-25 RX ADMIN — DOCUSATE SODIUM 100 MG: 100 CAPSULE, LIQUID FILLED ORAL at 09:15

## 2023-06-25 RX ADMIN — IBUPROFEN 800 MG: 800 TABLET, FILM COATED ORAL at 02:49

## 2023-06-25 ASSESSMENT — ACTIVITIES OF DAILY LIVING (ADL)
ADLS_ACUITY_SCORE: 20

## 2023-06-25 NOTE — PLAN OF CARE
Goal Outcome Evaluation:    Problem: Postpartum (Vaginal Delivery)  Goal: Hemostasis  Outcome: Progressing     Problem: Postpartum (Vaginal Delivery)  Goal: Optimal Pain Control and Function  Outcome: Progressing     Vitals stable. Pain managed with PRN ibuprofen. Pt requested meds for heartburn. Breastfeeding and bonding well with .

## 2023-06-25 NOTE — DISCHARGE SUMMARY
HOSPITAL DISCHARGE SUMMARY - VAGINAL BIRTH    Patient Name: Ana Deng   YOB: 1990  Age: 33 year old  Medical Record Number: [id number]  Primary Physician: Jairo Lyons    Admission Date:  2023  Delivery Date:  23   Gestational Age at Delivery:  37w1d   Discharge Date:  2023    REASON FOR ADMISSION: Labor and Delivery    DIAGNOSIS:    1. IUP  33 year old  female delivery at 37w1d   2. APGARS at 1 min 8, at 5 min 9  3. Baby's Weight 7 lbs 3.7oz    PROCEDURE:   Normal spontaneous vaginal delivery    CONDITIONS COMPLICATING ANTEPARTUM/POSTPARTUM: High Risk Pregnancy:  gestational hypertension    SIGNIFICANT DIAGNOSTIC PROCEDURES:   none    CONSULTS:   none    HISTORY OF PRESENT ILLNESS AND HOSPITAL COURSE: This is a 33 year old  female who underwent Normal spontaneous vaginal delivery. Post partum course was unremarkable, blood pressure remained normal post delivery. On the day that she was discharged, vital signs were stable. Her pain was controlled, she was tolerating diet. She was voiding and passing gas.     LABS:  Lab Results   Component Value Date    HGB 10.0 (L) 2023       PENDING LABS:  none    DISPOSITION:  Home    CONDITION AT DISCHARGE:  Good/Stable    Discharge Medications:      Review of your medicines      START taking      Dose / Directions   acetaminophen 325 MG tablet  Commonly known as: TYLENOL  Used for: Vaginal delivery      Dose: 975 mg  Take 3 tablets (975 mg) by mouth every 6 hours as needed for headaches  Refills: 0     ibuprofen 200 MG tablet  Commonly known as: ADVIL/MOTRIN  Used for: Vaginal delivery      Dose: 600 mg  Take 3 tablets (600 mg) by mouth every 6 hours as needed for pain  Refills: 0        CONTINUE these medicines which have NOT CHANGED      Dose / Directions   butalbital-acetaminophen-caffeine -40 MG tablet  Commonly known as: ESGIC      TAKE 2 TABLETS EVERY 6-8 HOURS BY ORAL ROUTE.  Refills: 0     FLUoxetine  40 MG capsule  Commonly known as: PROzac  Used for: Vaginal delivery      Dose: 40 mg  Take 1 capsule (40 mg) by mouth daily  Quantity: 30 capsule  Refills: 0     omeprazole 10 MG DR capsule  Commonly known as: priLOSEC  Indication: Heartburn      Dose: 20 mg  Take 20 mg by mouth daily  Refills: 0     Prenatal Formula 28-0.8-235 MG Caps      1 cap(s) orally once a day  Refills: 0           Where to get your medicines      These medications were sent to [facility] #[id number] - [place], [place] - [address] AT [facility] [address]  [address], [facility] [address]    Phone: [phone]     FLUoxetine 40 MG capsule     Some of these will need a paper prescription and others can be bought over the counter. Ask your nurse if you have questions.    You don't need a prescription for these medications    acetaminophen 325 MG tablet    ibuprofen 200 MG tablet           DISCHARGE PLAN:   - Follow up with  [facility], in 6 weeks  - Take medication as prescribed  - Physical activity: As tolerated, no heavy lifting. Pelvic rest.  - Diet:  Regular  - Medication:  Please see MAR  - Warning signs discussed with patient about when to call the clinic/hospital  - All questions and concerns were answered for the patient prior to discharge.       Alyssa Salmeron, DO on 6/25/2023 at 9:10 AM        I saw the patient on the date of discharge  Total time spent for discharge on date of discharge: 20 minutes

## 2023-06-25 NOTE — PLAN OF CARE
"  Problem: Plan of Care - These are the overarching goals to be used throughout the patient stay.    Goal: Plan of Care Review  Description: The Plan of Care Review/Shift note should be completed every shift.  The Outcome Evaluation is a brief statement about your assessment that the patient is improving, declining, or no change.  This information will be displayed automatically on your shift note.  Outcome: Adequate for Care Transition  Goal: Patient-Specific Goal (Individualized)  Description: You can add care plan individualizations to a care plan. Examples of Individualization might be:  \"Parent requests to be called daily at 9am for status\", \"I have a hard time hearing out of my right ear\", or \"Do not touch me to wake me up as it startles me\".  Outcome: Adequate for Care Transition  Goal: Absence of Hospital-Acquired Illness or Injury  Outcome: Adequate for Care Transition  Intervention: Prevent Skin Injury  Recent Flowsheet Documentation  Taken 6/25/2023 1248 by Apple White RN  Body Position: position changed independently  Goal: Optimal Comfort and Wellbeing  Outcome: Adequate for Care Transition  Goal: Readiness for Transition of Care  Outcome: Adequate for Care Transition     Problem: Postpartum (Vaginal Delivery)  Goal: Successful Maternal Role Transition  Outcome: Adequate for Care Transition  Goal: Hemostasis  Outcome: Adequate for Care Transition  Goal: Absence of Infection Signs and Symptoms  Outcome: Adequate for Care Transition  Goal: Anesthesia/Sedation Recovery  Outcome: Adequate for Care Transition  Goal: Optimal Pain Control and Function  Outcome: Adequate for Care Transition  Goal: Effective Urinary Elimination  Outcome: Adequate for Care Transition   Goal Outcome Evaluation:    VSS, assessment WNL.  Adequate pain control using ibuprofen and Tylenol.  Bleeding minimal.  Caring for self and infant independently.  All education, discharge instructions, and warning signs completed with " patient.  States understanding and that all of her questions have been answered.    Apple White RN

## 2023-08-06 ENCOUNTER — HEALTH MAINTENANCE LETTER (OUTPATIENT)
Age: 33
End: 2023-08-06

## 2024-02-02 ENCOUNTER — LAB REQUISITION (OUTPATIENT)
Dept: LAB | Facility: CLINIC | Age: 34
End: 2024-02-02
Payer: COMMERCIAL

## 2024-02-02 ENCOUNTER — TRANSCRIBE ORDERS (OUTPATIENT)
Dept: OTHER | Age: 34
End: 2024-02-02

## 2024-02-02 ENCOUNTER — MEDICAL CORRESPONDENCE (OUTPATIENT)
Dept: HEALTH INFORMATION MANAGEMENT | Facility: CLINIC | Age: 34
End: 2024-02-02
Payer: COMMERCIAL

## 2024-02-02 ENCOUNTER — TRANSFERRED RECORDS (OUTPATIENT)
Dept: HEALTH INFORMATION MANAGEMENT | Facility: CLINIC | Age: 34
End: 2024-02-02
Payer: COMMERCIAL

## 2024-02-02 DIAGNOSIS — Z13.1 ENCOUNTER FOR SCREENING FOR DIABETES MELLITUS: ICD-10-CM

## 2024-02-02 DIAGNOSIS — Z01.419 ENCOUNTER FOR GYNECOLOGICAL EXAMINATION (GENERAL) (ROUTINE) WITHOUT ABNORMAL FINDINGS: ICD-10-CM

## 2024-02-02 DIAGNOSIS — E66.01 MORBID OBESITY (H): Primary | ICD-10-CM

## 2024-02-02 DIAGNOSIS — Z13.220 ENCOUNTER FOR SCREENING FOR LIPOID DISORDERS: ICD-10-CM

## 2024-02-02 DIAGNOSIS — E66.01 MORBID (SEVERE) OBESITY DUE TO EXCESS CALORIES (H): ICD-10-CM

## 2024-02-02 LAB
ALBUMIN SERPL BCG-MCNC: 4.1 G/DL (ref 3.5–5.2)
ALP SERPL-CCNC: 62 U/L (ref 40–150)
ALT SERPL W P-5'-P-CCNC: 18 U/L (ref 0–50)
ANION GAP SERPL CALCULATED.3IONS-SCNC: 12 MMOL/L (ref 7–15)
AST SERPL W P-5'-P-CCNC: 17 U/L (ref 0–45)
BILIRUB SERPL-MCNC: 0.3 MG/DL
BUN SERPL-MCNC: 13.3 MG/DL (ref 6–20)
CALCIUM SERPL-MCNC: 9.4 MG/DL (ref 8.6–10)
CHLORIDE SERPL-SCNC: 106 MMOL/L (ref 98–107)
CHOLEST SERPL-MCNC: 167 MG/DL
CREAT SERPL-MCNC: 0.65 MG/DL (ref 0.51–0.95)
DEPRECATED HCO3 PLAS-SCNC: 25 MMOL/L (ref 22–29)
EGFRCR SERPLBLD CKD-EPI 2021: >90 ML/MIN/1.73M2
FASTING STATUS PATIENT QL REPORTED: ABNORMAL
GLUCOSE SERPL-MCNC: 88 MG/DL (ref 70–99)
HDLC SERPL-MCNC: 48 MG/DL
LDLC SERPL CALC-MCNC: 99 MG/DL
NONHDLC SERPL-MCNC: 119 MG/DL
POTASSIUM SERPL-SCNC: 4.2 MMOL/L (ref 3.4–5.3)
PROT SERPL-MCNC: 7.1 G/DL (ref 6.4–8.3)
SODIUM SERPL-SCNC: 143 MMOL/L (ref 135–145)
TRIGL SERPL-MCNC: 102 MG/DL
TSH SERPL DL<=0.005 MIU/L-ACNC: 1.83 UIU/ML (ref 0.3–4.2)

## 2024-02-02 PROCEDURE — 84443 ASSAY THYROID STIM HORMONE: CPT | Mod: ORL | Performed by: NURSE PRACTITIONER

## 2024-02-02 PROCEDURE — G0145 SCR C/V CYTO,THINLAYER,RESCR: HCPCS | Mod: ORL | Performed by: NURSE PRACTITIONER

## 2024-02-02 PROCEDURE — 80061 LIPID PANEL: CPT | Mod: ORL | Performed by: NURSE PRACTITIONER

## 2024-02-02 PROCEDURE — 80053 COMPREHEN METABOLIC PANEL: CPT | Mod: ORL | Performed by: NURSE PRACTITIONER

## 2024-02-02 PROCEDURE — 87624 HPV HI-RISK TYP POOLED RSLT: CPT | Mod: ORL | Performed by: NURSE PRACTITIONER

## 2024-02-05 ENCOUNTER — TRANSCRIBE ORDERS (OUTPATIENT)
Dept: OTHER | Age: 34
End: 2024-02-05

## 2024-02-05 DIAGNOSIS — E66.01 MORBID OBESITY (H): Primary | ICD-10-CM

## 2024-02-06 LAB
BKR LAB AP GYN ADEQUACY: NORMAL
BKR LAB AP GYN INTERPRETATION: NORMAL
BKR LAB AP HPV REFLEX: NORMAL
BKR LAB AP LMP: NORMAL
BKR LAB AP PREVIOUS ABNORMAL: NORMAL
PATH REPORT.COMMENTS IMP SPEC: NORMAL
PATH REPORT.COMMENTS IMP SPEC: NORMAL
PATH REPORT.RELEVANT HX SPEC: NORMAL

## 2024-02-08 LAB
HUMAN PAPILLOMA VIRUS 16 DNA: NEGATIVE
HUMAN PAPILLOMA VIRUS 18 DNA: NEGATIVE
HUMAN PAPILLOMA VIRUS FINAL DIAGNOSIS: NORMAL
HUMAN PAPILLOMA VIRUS OTHER HR: NEGATIVE

## 2024-09-29 ENCOUNTER — HEALTH MAINTENANCE LETTER (OUTPATIENT)
Age: 34
End: 2024-09-29

## 2024-10-03 ASSESSMENT — SLEEP AND FATIGUE QUESTIONNAIRES
HOW LIKELY ARE YOU TO NOD OFF OR FALL ASLEEP WHILE SITTING AND READING: MODERATE CHANCE OF DOZING
HOW LIKELY ARE YOU TO NOD OFF OR FALL ASLEEP IN A CAR, WHILE STOPPED FOR A FEW MINUTES IN TRAFFIC: WOULD NEVER DOZE
HOW LIKELY ARE YOU TO NOD OFF OR FALL ASLEEP WHILE WATCHING TV: WOULD NEVER DOZE
HOW LIKELY ARE YOU TO NOD OFF OR FALL ASLEEP WHILE LYING DOWN TO REST IN THE AFTERNOON WHEN CIRCUMSTANCES PERMIT: SLIGHT CHANCE OF DOZING
HOW LIKELY ARE YOU TO NOD OFF OR FALL ASLEEP WHILE SITTING INACTIVE IN A PUBLIC PLACE: WOULD NEVER DOZE
HOW LIKELY ARE YOU TO NOD OFF OR FALL ASLEEP WHEN YOU ARE A PASSENGER IN A CAR FOR AN HOUR WITHOUT A BREAK: SLIGHT CHANCE OF DOZING
HOW LIKELY ARE YOU TO NOD OFF OR FALL ASLEEP WHILE SITTING AND TALKING TO SOMEONE: WOULD NEVER DOZE
HOW LIKELY ARE YOU TO NOD OFF OR FALL ASLEEP WHILE SITTING QUIETLY AFTER LUNCH WITHOUT ALCOHOL: WOULD NEVER DOZE

## 2024-10-08 ENCOUNTER — OFFICE VISIT (OUTPATIENT)
Dept: FAMILY MEDICINE | Facility: CLINIC | Age: 34
End: 2024-10-08
Payer: COMMERCIAL

## 2024-10-08 VITALS
BODY MASS INDEX: 43.92 KG/M2 | OXYGEN SATURATION: 100 % | WEIGHT: 238.7 LBS | DIASTOLIC BLOOD PRESSURE: 83 MMHG | SYSTOLIC BLOOD PRESSURE: 139 MMHG | HEART RATE: 63 BPM | TEMPERATURE: 98.6 F | RESPIRATION RATE: 16 BRPM | HEIGHT: 62 IN

## 2024-10-08 DIAGNOSIS — Z13.29 SCREENING FOR THYROID DISORDER: ICD-10-CM

## 2024-10-08 DIAGNOSIS — E78.5 DYSLIPIDEMIA: ICD-10-CM

## 2024-10-08 DIAGNOSIS — E55.9 VITAMIN D DEFICIENCY: ICD-10-CM

## 2024-10-08 DIAGNOSIS — Z13.228 SCREENING FOR METABOLIC DISORDER: ICD-10-CM

## 2024-10-08 DIAGNOSIS — E66.01 CLASS 3 SEVERE OBESITY DUE TO EXCESS CALORIES WITH BODY MASS INDEX (BMI) OF 40.0 TO 44.9 IN ADULT, UNSPECIFIED WHETHER SERIOUS COMORBIDITY PRESENT (H): Primary | ICD-10-CM

## 2024-10-08 DIAGNOSIS — E66.813 CLASS 3 SEVERE OBESITY DUE TO EXCESS CALORIES WITH BODY MASS INDEX (BMI) OF 40.0 TO 44.9 IN ADULT, UNSPECIFIED WHETHER SERIOUS COMORBIDITY PRESENT (H): Primary | ICD-10-CM

## 2024-10-08 DIAGNOSIS — Z13.220 SCREENING, LIPID: ICD-10-CM

## 2024-10-08 DIAGNOSIS — F41.9 ANXIETY: ICD-10-CM

## 2024-10-08 DIAGNOSIS — Z86.39 HISTORY OF INSULIN RESISTANCE: ICD-10-CM

## 2024-10-08 DIAGNOSIS — Z13.0 SCREENING, ANEMIA, DEFICIENCY, IRON: ICD-10-CM

## 2024-10-08 DIAGNOSIS — Z13.1 SCREENING FOR DIABETES MELLITUS: ICD-10-CM

## 2024-10-08 PROBLEM — Z98.890 HISTORY OF BILATERAL BREAST REDUCTION SURGERY: Status: ACTIVE | Noted: 2021-07-25

## 2024-10-08 PROBLEM — Z36.89 ENCOUNTER FOR TRIAGE IN PREGNANT PATIENT: Status: RESOLVED | Noted: 2023-06-22 | Resolved: 2024-10-08

## 2024-10-08 PROBLEM — N92.0 MENORRHAGIA WITH REGULAR CYCLE: Status: ACTIVE | Noted: 2024-10-08

## 2024-10-08 PROBLEM — O13.9 PIH (PREGNANCY INDUCED HYPERTENSION): Status: RESOLVED | Noted: 2023-06-22 | Resolved: 2024-10-08

## 2024-10-08 LAB
BASOPHILS # BLD AUTO: 0.1 10E3/UL (ref 0–0.2)
BASOPHILS NFR BLD AUTO: 1 %
EOSINOPHIL # BLD AUTO: 0.4 10E3/UL (ref 0–0.7)
EOSINOPHIL NFR BLD AUTO: 5 %
ERYTHROCYTE [DISTWIDTH] IN BLOOD BY AUTOMATED COUNT: 14.2 % (ref 10–15)
EST. AVERAGE GLUCOSE BLD GHB EST-MCNC: 100 MG/DL
HBA1C MFR BLD: 5.1 % (ref 0–5.6)
HCT VFR BLD AUTO: 42.3 % (ref 35–47)
HGB BLD-MCNC: 14.1 G/DL (ref 11.7–15.7)
IMM GRANULOCYTES # BLD: 0 10E3/UL
IMM GRANULOCYTES NFR BLD: 0 %
LYMPHOCYTES # BLD AUTO: 3.5 10E3/UL (ref 0.8–5.3)
LYMPHOCYTES NFR BLD AUTO: 38 %
MCH RBC QN AUTO: 28.4 PG (ref 26.5–33)
MCHC RBC AUTO-ENTMCNC: 33.3 G/DL (ref 31.5–36.5)
MCV RBC AUTO: 85 FL (ref 78–100)
MONOCYTES # BLD AUTO: 0.5 10E3/UL (ref 0–1.3)
MONOCYTES NFR BLD AUTO: 6 %
NEUTROPHILS # BLD AUTO: 4.6 10E3/UL (ref 1.6–8.3)
NEUTROPHILS NFR BLD AUTO: 51 %
PLATELET # BLD AUTO: 284 10E3/UL (ref 150–450)
RBC # BLD AUTO: 4.96 10E6/UL (ref 3.8–5.2)
WBC # BLD AUTO: 9.1 10E3/UL (ref 4–11)

## 2024-10-08 PROCEDURE — 85025 COMPLETE CBC W/AUTO DIFF WBC: CPT | Performed by: FAMILY MEDICINE

## 2024-10-08 PROCEDURE — 80061 LIPID PANEL: CPT | Performed by: FAMILY MEDICINE

## 2024-10-08 PROCEDURE — 84443 ASSAY THYROID STIM HORMONE: CPT | Performed by: FAMILY MEDICINE

## 2024-10-08 PROCEDURE — 80053 COMPREHEN METABOLIC PANEL: CPT | Performed by: FAMILY MEDICINE

## 2024-10-08 PROCEDURE — 82306 VITAMIN D 25 HYDROXY: CPT | Performed by: FAMILY MEDICINE

## 2024-10-08 PROCEDURE — 90656 IIV3 VACC NO PRSV 0.5 ML IM: CPT | Performed by: FAMILY MEDICINE

## 2024-10-08 PROCEDURE — 90471 IMMUNIZATION ADMIN: CPT | Performed by: FAMILY MEDICINE

## 2024-10-08 PROCEDURE — 83036 HEMOGLOBIN GLYCOSYLATED A1C: CPT | Performed by: FAMILY MEDICINE

## 2024-10-08 PROCEDURE — 36415 COLL VENOUS BLD VENIPUNCTURE: CPT | Performed by: FAMILY MEDICINE

## 2024-10-08 PROCEDURE — 99204 OFFICE O/P NEW MOD 45 MIN: CPT | Mod: 25 | Performed by: FAMILY MEDICINE

## 2024-10-08 NOTE — ASSESSMENT & PLAN NOTE
SEVERE OBESITY WITH BMI 43.66 AT INTAKE AND CHRONIC COMORBID WEIGHT RELATED CONDITIONS INCULDING: dyslipidemia, depression/ anxiety, vitamin d deficiency, menorrhagia, and hx insulin resistance.     PATIENT IS BEGINNING ACTIVE MEDICALLY SUPERVISED WEIGHT LOSS STARTING AT Body mass index is 43.66 kg/m . - planning to utilize low calorie diet, physical activity and medications to facilitate this loss    Contraception -vasectomy - she understands she cannot become pregnant on weight loss medications as they have generally, not been tested in pregnancy.     Patient declined 24 week weight loss program due to wants to meet with a therapist for now and may change mind later.   Patient declined 3 pack health coaching. She wants to meet with a therapist.   We did discuss food supplements.     We discussed surgical weight loss options given her BMI is 43.66 and she  has the following weight related comorbid conditions: dyslipidemia, depression/ anxiety, vitamin d deficiency, menorrhagia, and hx insulin resistance.     Medications started today: zepbound with naltrexone as a back up option. If this medication is not attainable due to supply chain shortage or cost, please plan another visit (evisit, video or in person)    Current goal(s): start zepbound. Start psychotherapy    Lab assessment plan: 2/2024 tsh normal, nl cmp 2/2024, low hdl noted. Labs ordered.     Follow up 1-3.     DISCUSSION _________________________________________________________________    Dx:   Initial bmi is Body mass index is 43.66 kg/m .  With the following weight related comorbid medical conditions: dyslipidemia, depression/ anxiety, vitamin d deficiency, menorrhagia, and hx insulin resistance.     BECAUSE OF ABOVE PROBLEMS IT IS STRONGLY ADVISED THAT Ana LOSE WEIGHT.  BELOW IS MY PLAN FOR her     Mai, Shelly LOZOYA Md is her primary care provider - who referred her here today for help with weight loss.    Start weight 238 lbs, Body mass index is  43.66 kg/m .  10/8/2024     Medication notes:     Zepbound   10/8/2024 rx sent    Wegovy - could consider.   Saxenda - could consider  Metformin - took metformin in her teens and it didn't do much for her.   Wellbutrin  - caused agitation  Naltrexone - could consider.   Contrave  - contraindicated due to agitation with wellbutrin    Phentermine/ diethylproprion/ qsymia - avoid hx anxiety  Topamax - didn't like dysguesia.   Orlistat - could consider.

## 2024-10-08 NOTE — PROGRESS NOTES
"    New Medical Weight Management Consult    PATIENT:  Ana Deng  MRN:         [id number]  :         1990  JACKIE:         10/8/2024    Dear Dr. Gutiérrez,    I had the pleasure of seeing your patient, Ana Deng. Full intake/assessment was done to determine barriers to weight loss success and develop a treatment plan. Ana Deng is a 34 year old female interested in treatment of medical problems associated with excess weight. She has a height of 5' 2\", a weight of 238 lbs 11.2 oz, and the calculated Body mass index is 43.66 kg/m .    ASSESSMENT/PLAN:  Elen Morgan MD, Family Medicine and Diplomate of the [employer] Board [employer] Obesity Medicine 10/8/2024     ASSESSMENT AND PLAN:   I spent 48 minutes today in direct patient contact, 100% of the time in consultation concerning medical problems as listed below.     ASSESSMENT/ PLAN      Problem List Items Addressed This Visit          Digestive    Class 3 severe obesity with serious comorbidity and body mass index (BMI) of 40.0 to 44.9 in adult (H) - Primary     SEVERE OBESITY WITH BMI 43.66 AT INTAKE AND CHRONIC COMORBID WEIGHT RELATED CONDITIONS INCULDING: dyslipidemia, depression/ anxiety, vitamin d deficiency, menorrhagia, and hx insulin resistance.     PATIENT IS BEGINNING ACTIVE MEDICALLY SUPERVISED WEIGHT LOSS STARTING AT Body mass index is 43.66 kg/m . - planning to utilize low calorie diet, physical activity and medications to facilitate this loss    Contraception -vasectomy - she understands she cannot become pregnant on weight loss medications as they have generally, not been tested in pregnancy.     Patient declined 24 week weight loss program due to wants to meet with a therapist for now and may change mind later.   Patient declined 3 pack health coaching. She wants to meet with a therapist.   We did discuss food supplements.     We discussed surgical weight loss options given her BMI is 43.66 and she  has the following weight related comorbid " conditions: dyslipidemia, depression/ anxiety, vitamin d deficiency, menorrhagia, and hx insulin resistance.     Medications started today: zepbound with naltrexone as a back up option. If this medication is not attainable due to supply chain shortage or cost, please plan another visit (evisit, video or in person)    Current goal(s): start zepbound. Start psychotherapy    Lab assessment plan: 2/2024 tsh normal, nl cmp 2/2024, low hdl noted. Labs ordered.     Follow up 1-3.     DISCUSSION _________________________________________________________________    Dx:   Initial bmi is Body mass index is 43.66 kg/m .  With the following weight related comorbid medical conditions: dyslipidemia, depression/ anxiety, vitamin d deficiency, menorrhagia, and hx insulin resistance.     BECAUSE OF ABOVE PROBLEMS IT IS STRONGLY ADVISED THAT Ana LOSE WEIGHT.  BELOW IS MY PLAN FOR her     Mai, Shelly LOZOYA Md is her primary care provider - who referred her here today for help with weight loss.    Start weight 238 lbs, Body mass index is 43.66 kg/m .  10/8/2024     Medication notes:     Zepbound   10/8/2024 rx sent    Wegovy - could consider.   Saxenda - could consider  Metformin - took metformin in her teens and it didn't do much for her.   Wellbutrin  - caused agitation  Naltrexone - could consider.   Contrave  - contraindicated due to agitation with wellbutrin    Phentermine/ diethylproprion/ qsymia - avoid hx anxiety  Topamax - didn't like dysguesia.   Orlistat - could consider.          Vitamin D deficiency     Recheck vit d intake at initiation of physician assisted weight loss .         Relevant Orders    Vitamin D Deficiency       Endocrine    Dyslipidemia     Dyslipidemia, here for physician assisted weight loss. she does exercise about 3 times a week.          Relevant Orders    Lipid Profile       Other    History of insulin resistance     Will check A1c today.          Anxiety     Depression and anxiety, on prozac through  "Dr. Gutiérrez.          Other Visit Diagnoses       Screening for metabolic disorder        Relevant Orders    Comprehensive metabolic panel    Screening, anemia, deficiency, iron        Relevant Orders    CBC with Platelets & Differential    Screening for diabetes mellitus        Relevant Orders    Hemoglobin A1c    Screening for thyroid disorder        Relevant Orders    TSH with free T4 reflex    Screening, lipid                   She has the following co-morbidities:        10/3/2024    10:55 AM   --   I have the following health issues associated with obesity None of the above   I have the following symptoms associated with obesity Knee Pain    Back Pain    Fatigue    Groin Rash    Hip Pain            No data to display                    10/3/2024    10:55 AM   Referring Provider   Please name the provider who referred you to Medical Weight Management  If you do not know, please answer \"I Don't Know\" [facility] during well-woman exam.           10/3/2024    10:55 AM   Weight History   How concerned are you about your weight? Very Concerned   I became overweight As a Child   The following factors have contributed to my weight gain Mental Health Issues    A Health Crisis    Eating Too Much    Lack of Exercise    Genetic (Runs in the Family)    Stress   I have tried the following methods to lose weight Watching Portions or Calories    Exercise    Weight Watchers    Atkins-type Diet (Low Carb/High Protein)    Medications   My lowest weight since age 17 was 137   My highest weight since age 17 was 310   The most weight I have ever lost was (lbs) 173   I have the following family history of obesity/being overweight My mother is overweight    My father is overweight    Many of my relatives are overweight   How has your weight changed over the last year? Lost   How many pounds? 11.4           10/3/2024    10:55 AM   Diet Recall Review with Patient   If you do eat breakfast, what types of food do you eat? " Yogurt with protein powder, peanut butter, and berries   If you do eat lunch, what types of food do you typically eat? cottage cheese bowl, air-fryer chicken, leftovers   If you do eat supper, what types of food do you typically eat? Varies   If you do snack, what types of food do you typically eat? Popcorn, granola bars, fruit   How many glasses of juice do you drink in a typical day? 0   How many of glasses of milk do you drink in a typical day? 0   How many 8oz glasses of sugar containing drinks such as Romario-Aid/sweet tea do you drink in a day? 0   How many cans/bottles of sugar pop/soda/tea/sports drinks do you drink in a day? 0   How many cans/bottles of diet pop/soda/tea or sports drink do you drink in a day? 1   How often do you have a drink of alcohol? 2-3 TImes a Week   If you do drink, how many drinks might you have in a day? 3-4           10/3/2024    10:55 AM   Eating Habits   Generally, my meals include foods like these bread, pasta, rice, potatoes, corn, crackers, sweet dessert, pop, or juice A Few Times a Week   Generally, my meals include foods like these fried meats, brats, burgers, french fries, pizza, cheese, chips, or ice cream A Few Times a Week   Eat fast food (like McDonal[employer], [employer]iel, Taco Bell) Once a Week   Eat at a buffet or sit-down restaurant Less Than Weekly   Eat most of my meals in front of the TV or computer Everyday   Often skip meals, eat at random times, have no regular eating times Never   Rarely sit down for a meal but snack or graze throughout Never   Eat extra snacks between meals Less Than Weekly   Eat most of my food at the end of the day Never   Eat in the middle of the night or wake up at night to eat Never   Eat extra snacks to prevent or correct low blood sugar Never   Eat to prevent acid reflux or stomach pain Never   Worry about not having enough food to eat Never   I eat when I am depressed Less Than Weekly   I eat when I am stressed Less Than Weekly   I eat when I  am bored Less Than Weekly   I eat when I am anxious Less Than Weekly   I eat when I am happy or as a reward Once a Week   I feel hungry all the time even if I just have eaten Everyday   Feeling full is important to me Everyday   I finish all the food on my plate even if I am already full Everyday   I can't resist eating delicious food or walk past the good food/smell Once a Week   I eat/snack without noticing that I am eating Never   I eat when I am preparing the meal Never   I eat more than usual when I see others eating A Few Times a Week   I have trouble not eating sweets, ice cream, cookies, or chips if they are around the house A Few Times a Week   I think about food all day Everyday   What foods, if any, do you crave? Sweets/Candy/Chocolate   Please list any other foods you crave? Popcorn/salty snacks           10/3/2024    10:55 AM   Amount of Food   I feel out of control when eating Weekly   I eat a large amount of food, like a loaf of bread, a box of cookies, a pint/quart of ice cream, all at once Monthly   I eat a large amount of food even when I am not hungry Weekly   I eat rapidly Everyday   I eat alone because I feel embarrassed and do not want others to see how much I have eaten Never   I eat until I am uncomfortably full Weekly   I feel bad, disgusted, or guilty after I overeat Weekly           10/3/2024    10:55 AM   Activity/Exercise History   How much of a typical 12 hour day do you spend sitting? Most of the Day   How much of a typical 12 hour day do you spend lying down? Less Than Half the Day   How much of a typical day do you spend walking/standing? Less Than Half the Day   How many hours (not including work) do you spend on the TV/Video Games/Computer/Tablet/Phone? 2-3 Hours   How many times a week are you active for the purpose of exercise? 2-3 Times a Week   What keeps you from being more active? Lack of Time   How many total minutes do you spend doing some activity for the purpose of  exercising when you exercise? More Than 30 Minutes       PAST MEDICAL HISTORY:  Past Medical History:   Diagnosis Date    Encounter for triage in pregnant patient 06/22/2023    PIH (pregnancy induced hypertension) 06/22/2023    Vaginal delivery 08/02/2014           10/3/2024    10:55 AM   Work/Social History Reviewed With Patient   My employment status is Full-Time   My job is    How much of your job is spent on the computer or phone? 75%   How many hours do you spend commuting to work daily? Varies   What is your marital status? /In a Relationship   If in a relationship, is your significant other overweight? Yes   If you have children, are they overweight? No   Who do you live with?  and three children   Who does the food shopping? I do           10/3/2024    10:55 AM   Mental Health History Reviewed With Patient   Have you ever been physically or sexually abused? No   How often in the past 2 weeks have you felt little interest or pleasure in doing things? Not at all   Over the past 2 weeks how often have you felt down, depressed, or hopeless? Not at all           10/3/2024    10:55 AM   Sleep History Reviewed With Patient   How many hours do you sleep at night? 7       MEDICATIONS:   Current Outpatient Medications   Medication Sig Dispense Refill    FLUoxetine (PROZAC) 40 MG capsule Take 1 capsule (40 mg) by mouth daily 30 capsule 0    Multiple Vitamins-Minerals (HAIR SKIN AND NAILS FORMULA PO) Take by mouth.      Multiple Vitamins-Minerals (WOMENS MULTI PO) Take by mouth.      acetaminophen (TYLENOL) 325 MG tablet Take 3 tablets (975 mg) by mouth every 6 hours as needed for headaches      butalbital-acetaminophen-caffeine (ESGIC) -40 MG tablet TAKE 2 TABLETS EVERY 6-8 HOURS BY ORAL ROUTE.      ibuprofen (ADVIL/MOTRIN) 200 MG tablet Take 3 tablets (600 mg) by mouth every 6 hours as needed for pain      omeprazole (PRILOSEC) 10 MG DR capsule Take 20 mg by mouth daily      Prenatal  Vit-Fe Fum-FA-Omega (PRENATAL FORMULA) 28-0.8-235 MG CAPS 1 cap(s) orally once a day         ALLERGIES:   Allergies   Allergen Reactions    Latex Rash     Other reaction(s): red skin  Local reaction only      Latex     Sertraline Dizziness       PHYSICAL EXAM:  Physical Exam  Constitutional:       Appearance: Normal appearance.   HENT:      Head: Normocephalic and atraumatic.   Neck:      Thyroid: No thyroid mass, thyromegaly or thyroid tenderness.   Cardiovascular:      Rate and Rhythm: Normal rate and regular rhythm.   Pulmonary:      Effort: Pulmonary effort is normal.   Musculoskeletal:         General: Normal range of motion.      Cervical back: Normal range of motion and neck supple.   Neurological:      General: No focal deficit present.      Mental Status: She is alert and oriented to person, place, and time.         Sincerely,    Elen Morgan MD

## 2024-10-09 PROBLEM — E78.2 MIXED HYPERLIPIDEMIA: Status: ACTIVE | Noted: 2024-10-08

## 2024-10-09 LAB
ALBUMIN SERPL BCG-MCNC: 4.4 G/DL (ref 3.5–5.2)
ALP SERPL-CCNC: 65 U/L (ref 40–150)
ALT SERPL W P-5'-P-CCNC: 15 U/L (ref 0–50)
ANION GAP SERPL CALCULATED.3IONS-SCNC: 12 MMOL/L (ref 7–15)
AST SERPL W P-5'-P-CCNC: 18 U/L (ref 0–45)
BILIRUB SERPL-MCNC: 0.3 MG/DL
BUN SERPL-MCNC: 17.4 MG/DL (ref 6–20)
CALCIUM SERPL-MCNC: 9.2 MG/DL (ref 8.8–10.4)
CHLORIDE SERPL-SCNC: 102 MMOL/L (ref 98–107)
CHOLEST SERPL-MCNC: 203 MG/DL
CREAT SERPL-MCNC: 0.73 MG/DL (ref 0.51–0.95)
EGFRCR SERPLBLD CKD-EPI 2021: >90 ML/MIN/1.73M2
FASTING STATUS PATIENT QL REPORTED: NO
FASTING STATUS PATIENT QL REPORTED: NO
GLUCOSE SERPL-MCNC: 92 MG/DL (ref 70–99)
HCO3 SERPL-SCNC: 27 MMOL/L (ref 22–29)
HDLC SERPL-MCNC: 50 MG/DL
LDLC SERPL CALC-MCNC: 121 MG/DL
NONHDLC SERPL-MCNC: 153 MG/DL
POTASSIUM SERPL-SCNC: 4.5 MMOL/L (ref 3.4–5.3)
PROT SERPL-MCNC: 7.5 G/DL (ref 6.4–8.3)
SODIUM SERPL-SCNC: 141 MMOL/L (ref 135–145)
TRIGL SERPL-MCNC: 161 MG/DL
TSH SERPL DL<=0.005 MIU/L-ACNC: 1.85 UIU/ML (ref 0.3–4.2)
VIT D+METAB SERPL-MCNC: 30 NG/ML (ref 20–50)

## 2024-10-10 ENCOUNTER — MYC MEDICAL ADVICE (OUTPATIENT)
Dept: FAMILY MEDICINE | Facility: CLINIC | Age: 34
End: 2024-10-10
Payer: COMMERCIAL

## 2024-10-10 DIAGNOSIS — E66.01 CLASS 3 SEVERE OBESITY DUE TO EXCESS CALORIES WITH BODY MASS INDEX (BMI) OF 40.0 TO 44.9 IN ADULT, UNSPECIFIED WHETHER SERIOUS COMORBIDITY PRESENT (H): Primary | ICD-10-CM

## 2024-10-10 DIAGNOSIS — E66.813 CLASS 3 SEVERE OBESITY DUE TO EXCESS CALORIES WITH BODY MASS INDEX (BMI) OF 40.0 TO 44.9 IN ADULT, UNSPECIFIED WHETHER SERIOUS COMORBIDITY PRESENT (H): Primary | ICD-10-CM

## 2024-10-10 NOTE — TELEPHONE ENCOUNTER
"10/8 OV note-   \"Medications started today: zepbound with naltrexone as a back up option. If this medication is not attainable due to supply chain shortage or cost, please plan another visit (evisit, video or in person)\"    Zepbound was not sent in at time of visit, prepped below.   "

## 2024-10-14 ENCOUNTER — VIRTUAL VISIT (OUTPATIENT)
Dept: SURGERY | Facility: CLINIC | Age: 34
End: 2024-10-14
Payer: COMMERCIAL

## 2024-10-14 DIAGNOSIS — E66.01 MORBID OBESITY WITH BMI OF 40.0-44.9, ADULT (H): Primary | ICD-10-CM

## 2024-10-14 DIAGNOSIS — Z71.3 NUTRITIONAL COUNSELING: ICD-10-CM

## 2024-10-14 PROCEDURE — 97802 MEDICAL NUTRITION INDIV IN: CPT | Mod: 95 | Performed by: DIETITIAN, REGISTERED

## 2024-10-14 NOTE — PATIENT INSTRUCTIONS
Recipe Resources  Websites  www.[employer]  www.PicRate.Me.[employer]  https://www.HealthSpot.[employer]/recipes-ideas/recipes  https://Narus.[employer]/  www.sparkrecipes.[employer]  https://www.hungryHuman Genome Research Institutesgirl.com/  https://Promisecians.[employer]/recipe-index/    Blogs  https://Slingr.net/  SkinnySolapa4.[employer]  https://www.[place].[employer]/c/zachcoen - affordable meal prep  https://mealpreElectricite du [place].[employer]/ - meal prep recipes  https://[employer]Re[employer] Systems.[employer]/recipe-index/       Books  The Volumetrics Eating Plan- Jessica Otero, Ph. D  The Easy 5- Ingredient Healthy Cookbook: Simple Recipes to Make Healthy Eating Delicious- Chaka Townsend MS, SIENNA, AGNIESZKAN  The 30 Minute Mediterranean Diet Cookbook- Tena Segovia MS, RDN and Jacquelyn Santos RDN  Weeknight Wonders: Delicious, Healthy Dinners in 30 min or Less- Anitra Hannon  Smart Meal Prep for Beginners- Chaka Townsend MS, SIENNA, AGNIESZKAN    Eat Better ? Move More ? Live Well    Eat 3 nutrient-rich meals each day     Don't skip meals--it will cause you to overeat later in the day!     Eating fiber (vegetables/fruits/whole grains) and protein with meals helps you stay full longer     Choose foods with less than 10 grams of sugar and 5 grams of fat per serving to prevent excess calories and weight re-gain   Eat around the same times each day to develop a routine eating schedule    Avoid snacking unless physically hungry.   Planned snacks: 1-2 times per day and no more than 150 calories    Eat protein first    Protein helps with healing, maintaining adequate muscle mass, reducing hunger and optimizing nutritional status    Aim for  grams of protein per day   Fill up on Fiber    Fiber comes from plants--fruits, veggies, whole grains, nuts/seeds and beans    Fiber is low in calories, high in phytonutrients and helps you stay full longer    Aim for 25-35 grams per day by eating fiber with meals and snacks  Eat S-[id number]    Take 20-30 minutes to eat each  meal by taking small bites, chewing foods to applesauce consistency or 20-30 times before you swallow    Eating foods too fast can delay satiety/fullness signals and increase overeating   Slow down your eating by using toddler utensils, putting your fork/spoon down between bites and not watching TV or emailing during meals!   Keep a Journal          Writing down what you eat, how you feel and when you are active helps you identify new changes to work on from week to week          Look for ways to cut 100 calories from your current diet 2-3 times per day  Drink 64 ounces of 0-Calorie drinks between meals    Water    Zero calorie Propel  or Vitamin Water      SoBe Lifewater  Zero Calories    Crystal Light , Sugar-Free Romario-Aid , and other sugar-free lemonade or flavored celeste    Keep Caffeine to less than 300mg per day ie: 3-6oz cups coffee     Work up to 45-60 minutes of physical activity most days of the week    Helps with losing weight and prevent regaining those extra pounds!     Do a combo of cardio (walking/water exercises) and strength training (lifting weights/Vinyasa yoga)    Avoid Mindless Eating    Be present when you eat--take note of the smell, taste and quality of your food    Make a list of alternative activities you could do to prevent eating out of boredom/stress  Go for a walk, call a friend, chew gum, paint your nails, re-organize the garage, etc      LEAN PROTEIN SOURCES    Protein Source Portion Calories Grams of Protein                           Nonfat, plain Greek yogurt    (10 grams sugar or less) 3/4 cup (6 oz)  12-17   Light Yogurt (10 grams sugar or less) 3/4 cup (6 oz)  6-8   Protein Shake 1 shake 110-180 15-30   Skim/1% Milk or lactose-free milk 1 cup ( 8 oz)  8   Plain or light, flavored soymilk 1 cup  7-8   Plain or light, hemp milk 1 cup 110 6   Fat Free or 1% Cottage Cheese 1/2 cup 90 15   Part skim ricotta cheese 1/2 cup 100 14   Part skim or reduced fat  cheese slices 1/4cup, 3 dice 65-80 8     Mozzarella String Cheese 1 80 8   Canned tuna, chicken, crab or salmon  (canned in water)  1/2 cup 100 15-20   White fish (broiled, grilled, baked) 3 ounces 100 21   [place]/Tuna (broiled, grilled, baked) 3 ounces 150-180 21   Shrimp, Scallops, Lobster, Crab 3 ounces 100 21   Pork loin, Pork Tenderloin 3 ounces 150 21   Boneless, skinless chicken /turkey breast                          (broiled, grilled, baked) 3 ounces 120 21   [place], [place], [place], and Venison 3 ounces 120 21   Lean cuts of red meat and pork (sirloin,   round, tenderloin, flank, ground 93%-96%) 3 ounces 170 21   Lean or Extra Lean Ground Turkey 1/2 cup 150 20   90-95% Lean Steele Burger 1 rhiannon 140-180 21   Low-fat casserole with lean meat 3/4 cup 200 17   Luncheon Meats                                                        (turkey, lean ham, roast beef, chicken) 3 ounces 100 21   Egg (boiled, poached, scrambled) 1 Egg 60 7   Egg Substitute 1/2 cup 70 10   Nuts (limit to 1 serving per day)  3 Tbsp. 150 7   Nut [place] (peanut, almond)  Limit to 1 serving or less daily 1 Tbsp. 90 4   Soy Burger (varies) 1  10-15   Edamame  1/2 cup ~95 9   Garbanzo, Black, Belcher Beans 1/2 cup 110 7   Refried Beans 1/2 cup 100 7   Kidney and Lima beans 1/2 cup 110 7   Tempeh 3 oz 175 18   Vegan crumbles 1/2 cup 100 14   Tofu 1/2 cup 110 14   Chili (beans and extra lean beef or turkey) 1 cup 200 23   Lentil Stew/Soup 1 cup 150 12   Black Bean Soup 1 cup 175 12     Carbohydrates  Carbohydrates fuel your body with glucose (sugar)--the energy your body needs so you can do your daily activities.  Carbohydrates offer an immediate source of energy for your body. They provide the fuel for your muscles and organs, such as your brain.     Types of Carbohydrates     Complex Carbohydrates are higher in fiber and keep you feeling full longer--helping you eat less.   These are found in nearly all plant-based foods and usually take  longer for the body to digest.  They are most commonly found in whole-wheat bread, whole-grain pasta, brown rice, starchy vegetables,   and fruits  Refined Carbohydrates require almost NO WORK for digestion and break down into glucose more quickly   than complex carbohydrates. Refined carbohydrates are usually high in calories and low in nutrients and fiber--  eating more of these can lead to weight gain.  Thinking about eliminating carbohydrates???  If you do not eat enough carbohydrates, the following can occur:  Fatigue  Muscle cramps  Poor mental function  Fatigue easily results from deprivation of carbohydrates, which is seen in people who fast, possibly interfering with activities of daily living.      Thinking about eliminating carbohydrates???  If you do not eat enough carbohydrates, the following can occur:  Fatigue  Muscle cramps  Poor mental function  Fatigue easily results from deprivation of carbohydrates, which is seen in people who fast, possibly interfering with activities of daily living    Carbohydrates are your body's first choice for fuel. If given a choice of several types of foods simultaneously, your body will use the energy from carbohydrates first.    What foods contain carbohydrates?  Choose the following foods containing carbohydrates (the BEST ones to eat):   Fruit-fresh, frozen, canned in their own juices  Whole grains:  Whole-wheat breads  Brown rice  Oatmeal  Whole-grain cereals  Other starchy foods containing a minimum of 3 grams (g) fiber/100 calories  The ingredient label should list whole wheat or whole grain as one of the first ingredients (bulgur, quinoa, buckwheat, millet, spelt, faro, kasha)  Milk or yogurt (a natural source of carbohydrates):  Low-fat milk  Fat-free milk  Yogurt   Beans or legumes     Starchy vegetables, raw or frozen:  Potatoes  Peas  Corn    AVOID or limit the following foods containing carbohydrates:  Refined sugars, such as in:  Candy  Desserts-ice cream,  cakes, pies, brownies, frozen yogurt, sherbet/sorbet  Cookies  White flour: bread/pasta/crackers/rice/tortillas  Sugary snacks: sweetened cereal, granola bars, cereal bars, donuts, muffins, bagels  Sugary Drinks:  Fruit Juice, Smoothies  Sports Drinks  Regular Soda    What are typical serving sizes or portions?  The following are some serving and portion sizes for foods containing carbohydrates:  One medium piece of fruit, about 4?5 ounces (oz) (-tennis ball)  1 cup (C) berries or melon    C canned fruit    C juice (100% vegetable)    C starchy vegetables, cooked or chopped  One slice whole-grain bread  ? C brown rice, quinoa, buckwheat, millet, spelt, faro, kasha    C oatmeal (dry)    C bulgur  One small tortilla (less than 6inch diameter)    C wheat germ  1 oz pretzels     C flaked cereal        Calorie-Controlled Sample Meal Plans    Examples of small healthy meals    Breakfast   Omelet made with   cup to   cup egg substitute or 2 eggs    cup chopped vegetables  1-2 tbsp. of light cheese     cup salsa  Medium banana    1 cup non-fat plain, Greek yogurt mixed with 1 cup berries and 1-2 Tbsp nuts or cereal   -3/4 cup skim or 1% cottage cheese    cup unsweetened whole-grain cereal  1/2 cup of fresh strawberries  Whole-wheat English muffin or mini bagel, 1 scrambled egg and 1 slice Swiss cheese   Small orange  Protein Bar or Shake (15-30 grams protein and 15-25 grams Carbohydrates)    cup cottage cheese, low-fat    cup fresh fruit    11 ounces of Slim Fast Low Carb (only), Cyrus's Advantage, EAS Carb Control    Lunch/Dinner  2-3 slices roasted turkey breast  1 tbsp. of fat free mayonnaise  2 slices of  whole-wheat bread, Medium apple  10 baby carrots with 1 tbsp. of low-fat dip     cup water packed tuna or chicken  1 tablespoons of low-fat mayonnaise  1-2 tbsp. dill relish  1 serving of whole-grain crackers  1 cup of strawberries   6 inch turkey sub sandwich with light mayonnaise,   cup cottage cheese                                                                                                                                                       Black bean and low-fat cheese on a whole wheat tortilla with salsa and light sour cream  Grilled chicken sandwich  Tossed salad with light dressing    Baked potato with 3/4 cup of extra lean ground beef, light shredded cheese and salsa  Fresh fruit                                                 Chicken chunks with lettuce and vegetables stuffed in olimpia  Steamed broccoli                                                 3 oz boneless/skinless chicken breast  1/2 cup brown rice with stir-fried vegetables    grapefruit  3 ounces of salmon, trout, or tuna  1 cup of steamed asparagus  1 small slice whole grain Italian bread  Broiled white or pink fish  3/4 cup whole wheat pasta with tomatoes  3/4 cup of roasted red peppers  3 oz. of extra lean (93/7) hamburger on a Arnold's [place] Thins  Tossed salad with light dressing       Black bean or Tuscan bean soup with grated mozzarella cheese    of a flour tortilla    3 ounces of grilled pork loin with 1 tbsp. of low-sugar barbeque sauce, 1 cup of green beans seasoned with pepper  Small dinner roll or   cup of grapefruit sections    1-2 cups of torn manohar    cup of garbanzo beans or diced skinless chicken breast  5-6 cherry tomatoes  1  tbsp. of crumbled feta cheese  1 tbsp. of roasted soy nuts  1 tsp. of olive oil and 2-3 Tbsp. of balsamic or red wine vinegar  Small whole-wheat dinner roll or   cup of cut up pineapple       10 Ways Foods Can Reduce Inflammation - From Today's Dietitian  Many people with diabetes, high cholesterol, hypertension, and other chronic health problems have high levels of inflammation in their bodies that occur over time when the immune system tries unsuccessfully to repair cells and rid itself of harmful toxins. The right foods can help reduce the amount of inflammation in the body and improve health. Here are 10  suggestions for clients and patients for eating to decrease inflammation:    1. Boost consumption of fruits and vegetables. Aim to eat four to five servings each of fruits and vegetables daily. Choose fruits and vegetables that are deep green, orange, yellow, and purple, since these have the greatest nutritional value. Ten servings per day may sound like too much, but serving sizes are small: one medium fruit, 1/2 cup canned or frozen fruit, 1/2 cup cooked vegetable, 1/2 cup fruit juice, and 1 cup leafy raw greens.    2. Cook with olive oil as much as possible and use it to make salad dressings. Make a quick and easy dressing by combining 3/4 cup olive oil, 1/4 cup balsamic vinegar, 1/2 clove minced garlic, and 1 T each of chopped fresh parsley and chives. (Use 1/2 tsp dried herbs if fresh herbs aren t available.) Virgin olive oil is best since it has more inflammation-fighting antioxidants than refined olive oil.    3. Snack on walnuts instead of chips. Walnuts provide fiber, minerals, antioxidants, and the kinds of fatty acids that are good for your heart.     4. Eat a whole grain cereal such as oatmeal for breakfast, and replace refined grains with whole grains, such as substituting brown rice for white rice.    5. Eat fatty fish such as salmon two to three times per week to get more omega-3 fatty acids. Wild salmon has more omega-3s than farmed salmon.    6. Eat fewer fast foods. Many tend to be cooked in oils that contain trans fatty acids, which increase inflammation. If you eat at fast-food restaurants, order a grilled chicken sandwich or salad with vinaigrette dressing.    7. Replace white potatoes with sweet potatoes. They re high in vitamins and delicious when baked with a little olive oil, garlic, and rosemary.    8. Cut down on sugary drinks such as juice, soda, and punch. Add small amounts of cider, fruit juice, or wedges of lemon or orange to plain water to enhance the flavor.    9. Eat more lentils  and beans. They re good sources of protein and can replace red meat at meals. Try black beans and brown rice sautéed with onions and garlic and seasoned with cumin.    10. Munch on dark chocolate and fresh raspberries for dessert. Both are loaded with antioxidants.

## 2024-10-14 NOTE — PROGRESS NOTES
Ana Deng is a 34 year old who is being evaluated via a billable video visit.      How would you like to obtain your AVS? MyChart  If the video visit is dropped, the invitation should be resent by: Text to cell phone: [phone]  Will anyone else be joining your video visit? No          Medical Weight Loss Initial Diet Evaluation  Assessment:  This patient was referred by Dr. Morgan for MNT as treatment for Morbid obesity which is impacting knee/hip/back pain, fatigue, groin rash.     Ana is presenting today for a new weight management nutrition consultation. Pt has had an initial appointment with Dr. Morgan.    Weight loss medication:  Zepbound  - plans to  the medication today.      Anthropometrics:    Initial weight: 238.7 lbs  BMI: 43.66  Ideal body weight: 50.1 kg (110 lb 7.2 oz)  Adjusted ideal body weight: 73.4 kg (161 lb 12 oz)  Estimated RMR (Chester-St Jeor equation):  1,731 kcals x 1.2 (sedentary) = 2,077 kcals (for weight maintenance)  1,731 kcals x 1.3 (light active) = 2,380 kcals (for weight maintenance)    Recommended Protein Intake: 80 - 110 grams of protein/day    Medical History:  Patient Active Problem List   Diagnosis    Depression    History of bilateral breast reduction surgery    History of insulin resistance    Menorrhagia with regular cycle    Class 3 severe obesity with serious comorbidity and body mass index (BMI) of 40.0 to 44.9 in adult (H)    Vitamin D deficiency    Mixed hyperlipidemia    Anxiety   Diabetes: no, A1C of 5.1% on 10/8/2024    Nutrition History:   Food allergies/intolerances/cultural or religous food customs: Yes - scallops, bell peppers (gas), tomatoes.    Weight loss history: watching portions or calories, exercise, weight watchers, low CHO/high protein diet, medications. Patient has a complicated history weight weight loss. Patient has been overweight since high school -[id number] with stretich weight loss of over 100 lbs - patient stopped getting her  period during this time. Patient has three kids - issues with weight loss following 2nd and 3rd child. Patient stated that she was only able to lose 11 lbs since having her 3rd child.     -patient is interested in more anti-inflammatory food sources    -patient has been tracking on [employer] - aims for 1,586 kcals, 115-120 grams of protein    -patient is a  for a foster care facility - 13 years    Vitamins/Mineral Supplementation: fish oil, biotin, MVI (gummy), elderberry and vitamin C    Dietary Recall:  Breakfast (8-9 AM): whole milk greek yogurt with a scoop protein powder and PB2 powder and blueberries  Lunch (11:30-12): cottage cheese bowl with cheese sauce, pepperoni OR garbanzo bean salad with an apple  Snack: popcorn OR harvest snack chips OR beef stick  Dinner (4:30 PM): protein, vegetable and starch/grain  Snack (7 PM): Ninja Creami with a protein shake and pudding powder    Weekend:  B: same as above  L: same as above  D: will have a game night and host events    Overnight eating: No  Eating out: decreasing the past two months - 1x/week    Beverages:   Coffee plain  Water   Diet Coke - limited  Sparkling Water - limited  Kombucha - 1 x/week  Alcohol - trying to limit    Exercise:   No set routine at this time    Patient previously was consistent with movement with a friend     Likes walking    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  Discussed using a protein supplement as nutrition support  Nutrition Education   Discussed with patient how to build a meal: the importance of including a lean/low fat protein at each meal, include a source of vegetables at a minimum of lunch and dinner and  limiting carbohydrate intake  Educated on sources of lean protein, portion sizes, the amount of grams found in each source. Recommend patient to aim for 30+g protein at each meal.  Discussed the importance of adequate hydration, with emphasis on drinking 64oz of water or zero calorie beverages per day.  Nutrition Counseling   Encouraged importance of developing routine exercise for health benefits and weight loss.  Gradually increase movement    Goals established by patient:   Gradually increase fluid intake - start with 20 ounces/day and increase by 10 ounces every few days to a goal of 120 ounces/day  Continue tracking oral intake - switch to spot checks if needed  Be mindful on timing of meals - 4-5 hours  Try  fluids from meals to make sure you are fueling on food first    Handouts provided:  Intro to [facility]  Recipe Resources  10 Ways Foods Can Reduce Inflammation - From Today's Dietitian    Assessment/Plan:    Pt will follow up in 2 month(s) with bariatrician and 2 month(s) with dietitian.     Video-Visit Details    Type of service:  Video Visit    Video Start Time (time video started): 2:29 PM    Video End Time (time video stopped): 3:08 PM    Originating Location (pt. Location): Home      Distant Location (provider location):  On-site    Mode of Communication:  Video Conference via [facility]    Physician has received verbal consent for a Video Visit from the patient? Yes      Dorita Kelley RD

## 2024-10-14 NOTE — LETTER
10/14/2024      Ana Deng  [address] [facility] [address]      Dear Colleague,    Thank you for referring your patient, Ana Deng, to the [facility] [place]. Please see a copy of my visit note below.    Ana Deng is a 34 year old who is being evaluated via a billable video visit.      How would you like to obtain your AVS? MyChart  If the video visit is dropped, the invitation should be resent by: Text to cell phone: [phone]  Will anyone else be joining your video visit? No          Medical Weight Loss Initial Diet Evaluation  Assessment:  This patient was referred by Dr. Morgan for MNT as treatment for Morbid obesity which is impacting knee/hip/back pain, fatigue, groin rash.     Ana is presenting today for a new weight management nutrition consultation. Pt has had an initial appointment with Dr. Morgan.    Weight loss medication:  Zepbound  - plans to  the medication today.      Anthropometrics:    Initial weight: 238.7 lbs  BMI: 43.66  Ideal body weight: 50.1 kg (110 lb 7.2 oz)  Adjusted ideal body weight: 73.4 kg (161 lb 12 oz)  Estimated RMR ([place]-St Jeor equation):  1,731 kcals x 1.2 (sedentary) = 2,077 kcals (for weight maintenance)  1,731 kcals x 1.3 (light active) = 2,380 kcals (for weight maintenance)    Recommended Protein Intake: 80 - 110 grams of protein/day    Medical History:  Patient Active Problem List   Diagnosis     Depression     History of bilateral breast reduction surgery     History of insulin resistance     Menorrhagia with regular cycle     Class 3 severe obesity with serious comorbidity and body mass index (BMI) of 40.0 to 44.9 in adult (H)     Vitamin D deficiency     Mixed hyperlipidemia     Anxiety   Diabetes: no, A1C of 5.1% on 10/8/2024    Nutrition History:   Food allergies/intolerances/cultural or religous food customs: Yes - scallops, bell peppers (gas), tomatoes.    Weight loss history: watching  portions or calories, exercise, weight watchers, low CHO/high protein diet, medications. Patient has a complicated history weight weight loss. Patient has been overweight since high school -[phone] with stretich weight loss of over 100 lbs - patient stopped getting her period during this time. Patient has three kids - issues with weight loss following 2nd and 3rd child. Patient stated that she was only able to lose 11 lbs since having her 3rd child.     -patient is interested in more anti-inflammatory food sources    -patient has been tracking on [employer] - aims for 1,586 kcals, 115-120 grams of protein    -patient is a  for a foster care facility - 13 years    Vitamins/Mineral Supplementation: fish oil, biotin, MVI (gummy), elderberry and vitamin C    Dietary Recall:  Breakfast (8-9 AM): whole milk greek yogurt with a scoop protein powder and PB2 powder and blueberries  Lunch (11:30-12): cottage cheese bowl with cheese sauce, pepperoni OR garbanzo bean salad with an apple  Snack: popcorn OR harvest snack chips OR beef stick  Dinner (4:30 PM): protein, vegetable and starch/grain  Snack (7 PM): Ninja Creami with a protein shake and pudding powder    Weekend:  B: same as above  L: same as above  D: will have a game night and host events    Overnight eating: No  Eating out: decreasing the past two months - 1x/week    Beverages:   Coffee plain  Water   Diet Coke - limited  Sparkling Water - limited  Kombucha - 1 x/week  Alcohol - trying to limit    Exercise:   No set routine at this time    Patient previously was consistent with movement with a friend     Likes walking    Patient does have a seated peddler for under her desk    Nutrition Diagnosis (PES statement):   Morbid obesity related to excessive energy intake as evidence by BMI of 43.66     Nutrition Intervention  Food and/or Nutrient Delivery   Placed emphasis on importance of developing a healthy meal routine, aiming for 3 meals a day and no  snacks.  Discussed using a protein supplement as nutrition support  Nutrition Education   Discussed with patient how to build a meal: the importance of including a lean/low fat protein at each meal, include a source of vegetables at a minimum of lunch and dinner and limiting carbohydrate intake  Educated on sources of lean protein, portion sizes, the amount of grams found in each source. Recommend patient to aim for 30+g protein at each meal.  Discussed the importance of adequate hydration, with emphasis on drinking 64oz of water or zero calorie beverages per day.  Nutrition Counseling   Encouraged importance of developing routine exercise for health benefits and weight loss.  Gradually increase movement    Goals established by patient:   Gradually increase fluid intake - start with 20 ounces/day and increase by 10 ounces every few days to a goal of 120 ounces/day  Continue tracking oral intake - switch to spot checks if needed  Be mindful on timing of meals - 4-5 hours  Try  fluids from meals to make sure you are fueling on food first    Handouts provided:  Intro to [facility]  Recipe Resources  10 Ways Foods Can Reduce Inflammation - From Today's Dietitian    Assessment/Plan:    Pt will follow up in 2 month(s) with bariatrician and 2 month(s) with dietitian.     Video-Visit Details    Type of service:  Video Visit    Video Start Time (time video started): 2:29 PM    Video End Time (time video stopped): 3:08 PM    Originating Location (pt. Location): Home      Distant Location (provider location):  On-site    Mode of Communication:  Video Conference via [employer]    Physician has received verbal consent for a Video Visit from the patient? Yes      Dorita Kelley RD         Again, thank you for allowing me to participate in the care of your patient.        Sincerely,        Dorita Kelley RD

## 2024-10-30 DIAGNOSIS — E66.01 CLASS 3 SEVERE OBESITY DUE TO EXCESS CALORIES WITH BODY MASS INDEX (BMI) OF 40.0 TO 44.9 IN ADULT, UNSPECIFIED WHETHER SERIOUS COMORBIDITY PRESENT (H): Primary | ICD-10-CM

## 2024-10-30 DIAGNOSIS — E66.813 CLASS 3 SEVERE OBESITY DUE TO EXCESS CALORIES WITH BODY MASS INDEX (BMI) OF 40.0 TO 44.9 IN ADULT, UNSPECIFIED WHETHER SERIOUS COMORBIDITY PRESENT (H): Primary | ICD-10-CM

## 2024-10-30 NOTE — TELEPHONE ENCOUNTER
Medication Question or Refill        What medication are you calling about (include dose and sig)?: Zepbound    Preferred Pharm[facility]:   [id number]  [address]  AT [address] [place] [address]  Phone: [phone] Fax: [phone]      Controlled Substance Agreement on file:   CSA -- Patient Level:    CSA: None found at the patient level.       Who prescribed the medication?: Dr Morgan    Do you need a refill? Yes    When did you use the medication last? 10/30/24    Patient offered an appointment? Yes: 12/4    Do you have any questions or concerns?  Yes: patient took 3rd dose today, pt is inquiring if she will go up a dose for her next shots. Please advise.      Could we send this information to you in ManagerComplete or would you prefer to receive a phone call?:   Patient would like to be contacted via [employer]

## 2024-12-04 ENCOUNTER — OFFICE VISIT (OUTPATIENT)
Dept: FAMILY MEDICINE | Facility: CLINIC | Age: 34
End: 2024-12-04
Payer: COMMERCIAL

## 2024-12-04 VITALS
OXYGEN SATURATION: 99 % | DIASTOLIC BLOOD PRESSURE: 82 MMHG | RESPIRATION RATE: 16 BRPM | SYSTOLIC BLOOD PRESSURE: 130 MMHG | TEMPERATURE: 97.8 F | BODY MASS INDEX: 40.52 KG/M2 | WEIGHT: 220.2 LBS | HEART RATE: 71 BPM | HEIGHT: 62 IN

## 2024-12-04 DIAGNOSIS — N92.0 MENORRHAGIA WITH REGULAR CYCLE: ICD-10-CM

## 2024-12-04 DIAGNOSIS — E78.2 MIXED HYPERLIPIDEMIA: ICD-10-CM

## 2024-12-04 DIAGNOSIS — E66.01 CLASS 3 SEVERE OBESITY DUE TO EXCESS CALORIES WITH SERIOUS COMORBIDITY AND BODY MASS INDEX (BMI) OF 40.0 TO 44.9 IN ADULT (H): Primary | ICD-10-CM

## 2024-12-04 DIAGNOSIS — Z86.39 HISTORY OF INSULIN RESISTANCE: ICD-10-CM

## 2024-12-04 DIAGNOSIS — E66.813 CLASS 3 SEVERE OBESITY DUE TO EXCESS CALORIES WITH SERIOUS COMORBIDITY AND BODY MASS INDEX (BMI) OF 40.0 TO 44.9 IN ADULT (H): Primary | ICD-10-CM

## 2024-12-04 PROCEDURE — 99213 OFFICE O/P EST LOW 20 MIN: CPT | Performed by: FAMILY MEDICINE

## 2024-12-04 PROCEDURE — G2211 COMPLEX E/M VISIT ADD ON: HCPCS | Performed by: FAMILY MEDICINE

## 2024-12-04 PROCEDURE — 80061 LIPID PANEL: CPT | Performed by: FAMILY MEDICINE

## 2024-12-04 PROCEDURE — 36415 COLL VENOUS BLD VENIPUNCTURE: CPT | Performed by: FAMILY MEDICINE

## 2024-12-04 RX ORDER — OMEGA-3 FATTY ACIDS/FISH OIL 300-1000MG
CAPSULE ORAL
COMMUNITY

## 2024-12-04 NOTE — PATIENT INSTRUCTIONS
100+ grams of protein per day  800 grams of veggies/fruit day   Do not drink your calories    GLP1-ra options:    Wegovy (or Ozempic) aka semaglutide: cost per month retail ~$1400      Zepbound (or Mounjaro) aka tirzepatide: cost per month retail ~$1100 (there is an online coupon)

## 2024-12-04 NOTE — PROGRESS NOTES
Assessment & Plan   Problem List Items Addressed This Visit       Class 3 severe obesity with serious comorbidity and body mass index (BMI) of 40.0 to 44.9 in adult (H) - Primary     34 year old year old female in clinic today to discuss treatment of the following conditions through diet and lifestyle modification and weight loss:  1. Class 3 severe obesity due to excess calories with body mass index (BMI) of 40.0 to 44.9 in adult, unspecified whether serious comorbidity present (H)    2. History of insulin resistance    3. Mixed hyperlipidemia    4. Menorrhagia with regular cycle      The patient's weight loss result since the last visit was successful based on initial response to tirzepatide.  She is doing quite well.  She feels better with her current nutrition plan and with the medicine.  Mild heartburn symptoms that come and go.  She is focused on eating whole foods as the main nutritional optimization factor.  I challenged her to increase her protein will still maintaining a similar amount of caloric intake.  She is between gyms as her fitness center recently closed.  Her description of progress is very encouraging.  We will check lipids as she is fasting and I imagine that we better than they were a couple of months ago.  We discussed maintaining on 5 mg tirzepatide or going up to 10 mg.  We will go up to 7.5 and ultimately to 10 mg.  She should come back to clinic in 3-4 months to review progress and to ensure that the information required for insurance reapproval is readily available..         Relevant Medications    tirzepatide-Weight Management (ZEPBOUND) 7.5 MG/0.5ML prefilled pen    History of insulin resistance    Relevant Medications    tirzepatide-Weight Management (ZEPBOUND) 7.5 MG/0.5ML prefilled pen    Menorrhagia with regular cycle    Relevant Medications    tirzepatide-Weight Management (ZEPBOUND) 7.5 MG/0.5ML prefilled pen    Mixed hyperlipidemia    Relevant Medications    tirzepatide-Weight  "Management (ZEPBOUND) 7.5 MG/0.5ML prefilled pen    Other Relevant Orders    Lipid panel reflex to direct LDL Fasting             BMI  Estimated body mass index is 40.28 kg/m  as calculated from the following:    Height as of this encounter: 1.575 m (5' 2\").    Weight as of this encounter: 99.9 kg (220 lb 3.2 oz).   Weight management plan: Specific weight management program called Comprehensive weight management discussed    The longitudinal plan of care for the diagnosis(es)/condition(s) as documented were addressed during this visit. Due to the added complexity in care, I will continue to support Ana in the subsequent management and with ongoing continuity of care.    Subjective   Ana is a 34 year old, presenting for the following health issues:  Follow Up (Wt loss)        12/4/2024    11:32 AM   Additional Questions   Roomed by Jorden Fernandes MA   Accompanied by Self         12/4/2024    11:32 AM   Patient Reported Additional Medications   Patient reports taking the following new medications None     Patient presents for treatment of chronic, comorbid conditions using intensive therapeutic lifestyle interventions including nutrition, physical activity, and behavior management.   - successes: \"everything.\"  She likes that she does not think about food.  She has cut out much of her dairy. \"Inflammatory diet.'  Mostly whole foods. She has de-emphasized processed foods. She is still tracking.  She estimates 1400 calories or less.      - struggles: heart burn as side effect.  Hormonal acne.  Period variable.     - she can afford the medication   - medication benefits: helpful;. Side effects: heartburn.           Objective    /82 (BP Location: Left arm, Patient Position: Sitting, Cuff Size: Adult Large)   Pulse 71   Temp 97.8  F (36.6  C) (Oral)   Resp 16   Ht 1.575 m (5' 2\")   Wt 99.9 kg (220 lb 3.2 oz)   LMP 12/01/2024 (Exact Date)   SpO2 99%   BMI 40.28 kg/m    Body mass index is 40.28 kg/m .  Physical " Exam  Nursing note reviewed.   Constitutional:       General: She is not in acute distress.     Appearance: Normal appearance. She is not ill-appearing.   HENT:      Head: Normocephalic and atraumatic.   Eyes:      Extraocular Movements: Extraocular movements intact.      Conjunctiva/sclera: Conjunctivae normal.   Pulmonary:      Effort: Pulmonary effort is normal.   Neurological:      Mental Status: She is alert and oriented to person, place, and time.   Psychiatric:         Attention and Perception: Attention normal.         Mood and Affect: Mood normal.         Speech: Speech normal.         Thought Content: Thought content normal.                  Signed Electronically by: Franck Marino MD

## 2024-12-04 NOTE — ASSESSMENT & PLAN NOTE
34 year old year old female in clinic today to discuss treatment of the following conditions through diet and lifestyle modification and weight loss:  1. Class 3 severe obesity due to excess calories with body mass index (BMI) of 40.0 to 44.9 in adult, unspecified whether serious comorbidity present (H)    2. History of insulin resistance    3. Mixed hyperlipidemia    4. Menorrhagia with regular cycle      The patient's weight loss result since the last visit was successful based on initial response to tirzepatide.  She is doing quite well.  She feels better with her current nutrition plan and with the medicine.  Mild heartburn symptoms that come and go.  She is focused on eating whole foods as the main nutritional optimization factor.  I challenged her to increase her protein will still maintaining a similar amount of caloric intake.  She is between gyms as her fitness center recently closed.  Her description of progress is very encouraging.  We will check lipids as she is fasting and I imagine that we better than they were a couple of months ago.  We discussed maintaining on 5 mg tirzepatide or going up to 10 mg.  We will go up to 7.5 and ultimately to 10 mg.  She should come back to clinic in 3-4 months to review progress and to ensure that the information required for insurance reapproval is readily available..

## 2024-12-05 LAB
CHOLEST SERPL-MCNC: 171 MG/DL
FASTING STATUS PATIENT QL REPORTED: YES
HDLC SERPL-MCNC: 47 MG/DL
LDLC SERPL CALC-MCNC: 112 MG/DL
NONHDLC SERPL-MCNC: 124 MG/DL
TRIGL SERPL-MCNC: 61 MG/DL

## 2025-01-02 ENCOUNTER — MYC MEDICAL ADVICE (OUTPATIENT)
Dept: FAMILY MEDICINE | Facility: CLINIC | Age: 35
End: 2025-01-02
Payer: COMMERCIAL

## 2025-01-02 DIAGNOSIS — Z86.39 HISTORY OF INSULIN RESISTANCE: Primary | ICD-10-CM

## 2025-01-02 DIAGNOSIS — E78.2 MIXED HYPERLIPIDEMIA: ICD-10-CM

## 2025-01-02 DIAGNOSIS — N92.0 MENORRHAGIA WITH REGULAR CYCLE: ICD-10-CM

## 2025-01-02 NOTE — TELEPHONE ENCOUNTER
12/4 OV note-   Class 3 severe obesity with serious comorbidity and body mass index (BMI) of 40.0 to 44.9 in adult (H) - Primary        34 year old year old female in clinic today to discuss treatment of the following conditions through diet and lifestyle modification and weight loss:  1. Class 3 severe obesity due to excess calories with body mass index (BMI) of 40.0 to 44.9 in adult, unspecified whether serious comorbidity present (H)    2. History of insulin resistance    3. Mixed hyperlipidemia    4. Menorrhagia with regular cycle       The patient's weight loss result since the last visit was successful based on initial response to tirzepatide.  She is doing quite well.  She feels better with her current nutrition plan and with the medicine.  Mild heartburn symptoms that come and go.  She is focused on eating whole foods as the main nutritional optimization factor.  I challenged her to increase her protein will still maintaining a similar amount of caloric intake.  She is between gyms as her fitness center recently closed.  Her description of progress is very encouraging.  We will check lipids as she is fasting and I imagine that we better than they were a couple of months ago.  We discussed maintaining on 5 mg tirzepatide or going up to 10 mg.  We will go up to 7.5 and ultimately to 10 mg.  She should come back to clinic in 3-4 months to review progress and to ensure that the information required for insurance reapproval is readily available..

## 2025-01-11 DIAGNOSIS — Z86.39 HISTORY OF INSULIN RESISTANCE: ICD-10-CM

## 2025-01-11 DIAGNOSIS — E78.2 MIXED HYPERLIPIDEMIA: ICD-10-CM

## 2025-01-11 DIAGNOSIS — N92.0 MENORRHAGIA WITH REGULAR CYCLE: ICD-10-CM

## 2025-01-12 RX ORDER — TIRZEPATIDE 10 MG/.5ML
10 INJECTION, SOLUTION SUBCUTANEOUS
Qty: 2 ML | Refills: 2 | Status: SHIPPED | OUTPATIENT
Start: 2025-01-12

## 2025-01-27 NOTE — PROGRESS NOTES
Ana Deng is a 34 year old who is being evaluated via a billable video visit.      How would you like to obtain your AVS? MyChart  If the video visit is dropped, the invitation should be resent by: Text to cell phone: [phone]  Will anyone else be joining your video visit? No        Medical  Weight Loss Follow-Up Diet Evaluation  Assessment:  Ana is presenting today for a follow up weight management nutrition consultation.  This patient has had an initial appointment and was referred by Dr. Dailey  for MNT as treatment for Morbid obesity  Weight loss medication:  Zepbound .   Pt's weight is 198.8 lbs  Initial weight: 238.7 lbs  Weight change: 39.9 lbs, 16.7% weight loss        12/4/2024    11:31 AM   Changes and Difficulties   I have made the following changes to my diet since my last visit: anti inflammatory   With regards to my diet, I am still struggling with: unsure   I have made the following changes to my activity/exercise since my last visit: none   With regards to my activity/exercise, I am still struggling with: none     BMI: 36.36  Ideal body weight: 50.1 kg (110 lb 7.2 oz)  Adjusted ideal body weight: 70 kg (154 lb 5.6 oz)    Estimated RMR ([place]-St Jeor equation):   1,545 kcals x 1.2 (sedentary) = 1,854 kcals (for weight maintenance)  Recommended Protein Intake: 80 - 110 grams of protein/day  Patient Active Problem List:  Patient Active Problem List   Diagnosis    Depression    History of bilateral breast reduction surgery    History of insulin resistance    Menorrhagia with regular cycle    Class 3 severe obesity with serious comorbidity and body mass index (BMI) of 40.0 to 44.9 in adult (H)    Vitamin D deficiency    Mixed hyperlipidemia    Anxiety   Diabetes: no, A1C of 5.1% on 10/8/2024  Nutrition History:   Food allergies/intolerances/cultural or religous food customs: Yes - scallops, bell peppers (gas), tomatoes.  Weight loss history: watching portions or calories, exercise, weight  watchers, low CHO/high protein diet, medications. Patient has a complicated history weight weight loss. Patient has been overweight since high school -[phone] with stretich weight loss of over 100 lbs - patient stopped getting her period during this time. Patient has three kids - issues with weight loss following 2nd and 3rd child. Patient stated that she was only able to lose 11 lbs since having her 3rd child.   -patient is interested in more anti-inflammatory food sources  -patient has been tracking on My[employer]itnessPal - aims for 1,586 kcals, 115-120 grams of protein  Progress on goals from last visit: Patient stated that she cut out most dairy when we first met - added a little back in a couple of months. Patient stated that her calorie intake is lower - <1,000 kcals after her shot day - 1,200 - 1,300 middle of the week - 1,500 kcals average. Patient stated that her protein has been 30-50 grams of protein.   Goals:   Gradually increase fluid intake - start with 20 ounces/day and increase by 10 ounces every few days to a goal of 120 ounces/day  Continue tracking oral intake - switch to spot checks if needed  Be mindful on timing of meals - 4-5 hours  Try  fluids from meals to make sure you are fueling on food first    Dietary Recall:  *Allergy to scallops, bell peppers (gas), tomatoes  Tumeric shot when waking up  Breakfast (after 10 AM): oatmeal with almond milk, bryant, flax seeds and blueberries)  Lunch (1:30-2 PM): leftovers  Dinner (5 PM): protein, vegetables and starch  Limited intake after 6 PM  Typical snacks: limited snacking during the week - more snacking on the weekends   Beverages:   Coffee plain (trying to decrease caffeine intake) - 1 cup  Water (plain and with liquid IV) - 3 bottles (40 ounces)  Diet Coke - limited  Sparkling Water - limited  Kombucha - 1 x/week  Alcohol - trying to limit  Exercise:   No set routine at this time  Patient previously was consistent with movement with a friend    Likes walking  Patient does have a seated peddler for under her desk  Nutrition Diagnosis:    Obesity related to excessive energy intake as evidence by BMI of 36.36    Intervention:  Food and/or nutrient delivery: Shift breakfast sooner - within 90 minutes of waking up. Continue to have three meals per day. Separate fluids from meals - fuel on food first  Nutrition education: High protein meal plan    Monitoring/Evaluation:    Goals:  Shift breakfast forward to improve calorie balance throughout the day  Continue to separate fluids from meals  Continue three meals per day    Patient to follow up TBD with bariatrician and with RD    Video-Visit Details    Type of service:  Video Visit    Video Start Time (time video started): 3:31 PM    Video End Time (time video stopped): 3:53 PM    Originating Location (pt. Location): Home      Distant Location (provider location):  Off-site    Mode of Communication:  Video Conference via [facility]    Physician has received verbal consent for a Video Visit from the patient? Yes      Dorita Kelley RD

## 2025-01-28 ENCOUNTER — MYC MEDICAL ADVICE (OUTPATIENT)
Dept: FAMILY MEDICINE | Facility: CLINIC | Age: 35
End: 2025-01-28
Payer: COMMERCIAL

## 2025-01-28 NOTE — TELEPHONE ENCOUNTER
Patient update on current Zepbound dosing (10 mg) and how she's tolerated the medication this month, as well as wanting provider recommendations on staying at 10 mg vs continuing to titrate dose.    Last OV 12/4/24      Nadiya Dean RN  [facility]

## 2025-01-29 ENCOUNTER — VIRTUAL VISIT (OUTPATIENT)
Dept: SURGERY | Facility: CLINIC | Age: 35
End: 2025-01-29
Payer: COMMERCIAL

## 2025-01-29 DIAGNOSIS — E66.9 OBESITY (BMI 30-39.9): Primary | ICD-10-CM

## 2025-01-29 DIAGNOSIS — Z71.3 NUTRITIONAL COUNSELING: ICD-10-CM

## 2025-01-29 PROCEDURE — 97803 MED NUTRITION INDIV SUBSEQ: CPT | Mod: 95 | Performed by: DIETITIAN, REGISTERED

## 2025-01-29 NOTE — LETTER
1/29/2025      Ana Deng  [address] [address]      Dear Colleague,    Thank you for referring your patient, Ana Deng, to the [facility] [place]. Please see a copy of my visit note below.    Ana Deng is a 34 year old who is being evaluated via a billable video visit.      How would you like to obtain your AVS? MyChart  If the video visit is dropped, the invitation should be resent by: Text to cell phone: [phone]  Will anyone else be joining your video visit? No        Medical  Weight Loss Follow-Up Diet Evaluation  Assessment:  Ana is presenting today for a follow up weight management nutrition consultation.  This patient has had an initial appointment and was referred by Dr. Dailey  for MNT as treatment for Morbid obesity  Weight loss medication:  Zepbound .   Pt's weight is 198.8 lbs  Initial weight: 238.7 lbs  Weight change: 39.9 lbs, 16.7% weight loss        12/4/2024    11:31 AM   Changes and Difficulties   I have made the following changes to my diet since my last visit: anti inflammatory   With regards to my diet, I am still struggling with: unsure   I have made the following changes to my activity/exercise since my last visit: none   With regards to my activity/exercise, I am still struggling with: none     BMI: 36.36  Ideal body weight: 50.1 kg (110 lb 7.2 oz)  Adjusted ideal body weight: 70 kg (154 lb 5.6 oz)    Estimated RMR ([place] equation):   1,545 kcals x 1.2 (sedentary) = 1,854 kcals (for weight maintenance)  Recommended Protein Intake: 80 - 110 grams of protein/day  Patient Active Problem List:  Patient Active Problem List   Diagnosis     Depression     History of bilateral breast reduction surgery     History of insulin resistance     Menorrhagia with regular cycle     Class 3 severe obesity with serious comorbidity and body mass index (BMI) of 40.0 to 44.9 in adult (H)     Vitamin D deficiency     Mixed  hyperlipidemia     Anxiety   Diabetes: no, A1C of 5.1% on 10/8/2024  Nutrition History:   Food allergies/intolerances/cultural or religous food customs: Yes - scallops, bell peppers (gas), tomatoes.  Weight loss history: watching portions or calories, exercise, weight watchers, low CHO/high protein diet, medications. Patient has a complicated history weight weight loss. Patient has been overweight since high school -[id number] with stretich weight loss of over 100 lbs - patient stopped getting her period during this time. Patient has three kids - issues with weight loss following 2nd and 3rd child. Patient stated that she was only able to lose 11 lbs since having her 3rd child.   -patient is interested in more anti-inflammatory food sources  -patient has been tracking on [employer] - aims for 1,586 kcals, 115-120 grams of protein  Progress on goals from last visit: Patient stated that she cut out most dairy when we first met - added a little back in a couple of months. Patient stated that her calorie intake is lower - <1,000 kcals after her shot day - 1,200 - 1,300 middle of the week - 1,500 kcals average. Patient stated that her protein has been 30-50 grams of protein.   Goals:   Gradually increase fluid intake - start with 20 ounces/day and increase by 10 ounces every few days to a goal of 120 ounces/day  Continue tracking oral intake - switch to spot checks if needed  Be mindful on timing of meals - 4-5 hours  Try  fluids from meals to make sure you are fueling on food first    Dietary Recall:  *Allergy to scallops, bell peppers (gas), tomatoes  Tumeric shot when waking up  Breakfast (after 10 AM): oatmeal with almond milk, bryant, flax seeds and blueberries)  Lunch (1:30-2 PM): leftovers  Dinner (5 PM): protein, vegetables and starch  Limited intake after 6 PM  Typical snacks: limited snacking during the week - more snacking on the weekends   Beverages:   Coffee plain (trying to decrease caffeine  intake) - 1 cup  Water (plain and with liquid IV) - 3 bottles (40 ounces)  Diet Coke - limited  Sparkling Water - limited  Kombucha - 1 x/week  Alcohol - trying to limit  Exercise:   No set routine at this time  Patient previously was consistent with movement with a friend   Likes walking  Patient does have a seated peddler for under her desk  Nutrition Diagnosis:    Obesity related to excessive energy intake as evidence by BMI of 36.36    Intervention:  Food and/or nutrient delivery: Shift breakfast sooner - within 90 minutes of waking up. Continue to have three meals per day. Separate fluids from meals - fuel on food first  Nutrition education: High protein meal plan    Monitoring/Evaluation:    Goals:  Shift breakfast forward to improve calorie balance throughout the day  Continue to separate fluids from meals  Continue three meals per day    Patient to follow up TBD with bariatrician and with RD    Video-Visit Details    Type of service:  Video Visit    Video Start Time (time video started): 3:31 PM    Video End Time (time video stopped): 3:53 PM    Originating Location (pt. Location): Home      Distant Location (provider location):  Off-site    Mode of Communication:  Video Conference via [facility]    Physician has received verbal consent for a Video Visit from the patient? Yes      Dorita Kelley RD           Again, thank you for allowing me to participate in the care of your patient.        Sincerely,        Dorita Kelley RD    Electronically signed

## 2025-02-26 ENCOUNTER — MYC MEDICAL ADVICE (OUTPATIENT)
Dept: FAMILY MEDICINE | Facility: CLINIC | Age: 35
End: 2025-02-26
Payer: COMMERCIAL

## 2025-02-26 DIAGNOSIS — E66.813 CLASS 3 SEVERE OBESITY DUE TO EXCESS CALORIES WITH SERIOUS COMORBIDITY AND BODY MASS INDEX (BMI) OF 40.0 TO 44.9 IN ADULT (H): ICD-10-CM

## 2025-02-26 DIAGNOSIS — E78.2 MIXED HYPERLIPIDEMIA: ICD-10-CM

## 2025-02-26 DIAGNOSIS — Z86.39 HISTORY OF INSULIN RESISTANCE: Primary | ICD-10-CM

## 2025-02-26 DIAGNOSIS — E66.01 CLASS 3 SEVERE OBESITY DUE TO EXCESS CALORIES WITH SERIOUS COMORBIDITY AND BODY MASS INDEX (BMI) OF 40.0 TO 44.9 IN ADULT (H): ICD-10-CM

## 2025-02-26 DIAGNOSIS — N92.0 MENORRHAGIA WITH REGULAR CYCLE: ICD-10-CM

## 2025-04-22 ENCOUNTER — MYC MEDICAL ADVICE (OUTPATIENT)
Dept: FAMILY MEDICINE | Facility: CLINIC | Age: 35
End: 2025-04-22
Payer: COMMERCIAL

## 2025-04-22 ENCOUNTER — TELEPHONE (OUTPATIENT)
Dept: FAMILY MEDICINE | Facility: CLINIC | Age: 35
End: 2025-04-22
Payer: COMMERCIAL

## 2025-04-22 NOTE — TELEPHONE ENCOUNTER
Prior Authorization Request  Who s requesting:  Patient  Pharmacy Name and Location: [place] [id number]  Medication Name: tirzepatide-Weight Management (ZEPBOUND) 15 MG/0.5ML prefilled pen   Insurance Plan: [facility]  Insurance Member ID Number:  [id number]  CoverMyMeds Key:   Informed patient that prior authorizations can take up to 10 business days for response:   NA  Okay to leave a detailed message: N/A

## 2025-04-29 NOTE — TELEPHONE ENCOUNTER
Prior Authorization Approval    Medication: ZEPBOUND 15 MG/0.5ML SC SOAJ  Authorization Effective Date: 4/29/2025  Authorization Expiration Date: 4/29/2026  Approved Dose/Quantity:   Reference #: [id number]   Insurance Company: [employer] ([facility]) - Phone [phone] Fax [phone]  Expected CoPay: $    CoPay Card Available:      Financial Assistance Needed:   Which Pharm[facility] filling the prescription[id number] [place] [address] AT [place] [address]

## 2025-04-29 NOTE — TELEPHONE ENCOUNTER
Shadi from [facility] called with patient on the line stating that PA has been approved and would like the Rx sent to [facility]

## 2025-04-29 NOTE — TELEPHONE ENCOUNTER
PA Initiation    Medication: ZEPBOUND 15 MG/0.5ML SC SOAJ  Insurance Company: [employer] ([facility]) - Phone [phone] Fax [phone]  Pharmacy Filling the Rx: [id number] [place] [address] AT [place] [address]  Filling Pharmacy Phone: [phone]  Filling Pharmacy Fax:    Start Date: 4/29/2025

## 2025-05-23 ENCOUNTER — MYC MEDICAL ADVICE (OUTPATIENT)
Dept: FAMILY MEDICINE | Facility: CLINIC | Age: 35
End: 2025-05-23
Payer: COMMERCIAL

## 2025-05-23 NOTE — TELEPHONE ENCOUNTER
"Spoke to patient via telephone. First noticed symptoms approximately 4 months ago which presented at a \"pins and needles\" type feeling on the top of her scalp. Progressed into feeling like her body was bruised when touched even though no bruising present. Yesterday noticed that her cheek bone felt sunburned which spread across her face throughout the day. Some \"muted irritation\" to face when asked about numbness. Denies weakness of any extremities, gait/mobility changes, visual changes, abnormal headaches (had HA yesterday on first day of menstrual cycle which is normal for her).     Rescheduled her [facility] follow-up appointment for sooner availability on 6/2. Will send to Dr. Marino for review prior to appointment.  "

## 2025-06-09 ENCOUNTER — VIRTUAL VISIT (OUTPATIENT)
Dept: FAMILY MEDICINE | Facility: CLINIC | Age: 35
End: 2025-06-09
Payer: COMMERCIAL

## 2025-06-09 DIAGNOSIS — E78.2 MIXED HYPERLIPIDEMIA: ICD-10-CM

## 2025-06-09 DIAGNOSIS — E66.813 CLASS 3 SEVERE OBESITY WITH SERIOUS COMORBIDITY AND BODY MASS INDEX (BMI) OF 40.0 TO 44.9 IN ADULT (H): Primary | ICD-10-CM

## 2025-06-09 DIAGNOSIS — Z86.39 HISTORY OF INSULIN RESISTANCE: ICD-10-CM

## 2025-06-09 NOTE — PROGRESS NOTES
"Ana is a 35 year old who is being evaluated via a billable video visit.    How would you like to obtain your AVS? MyChart  If the video visit is dropped, the invitation should be resent by: Text to cell phone: [phone]  Will anyone else be joining your video visit? No      Assessment & Plan   Assessment & Plan  Class 3 severe obesity with serious comorbidity and body mass index (BMI) of 40.0 to 44.9 in adult (H)  This patient is doing incredibly well.  She is on Zepbound and is down 65 pounds which corresponds to 27% total body weight since starting the medicine.  We reviewed her fluctuations of weight over the years.  She does not feel like she is changed nutrition all that much since starting the medicine and she believes she was actually doing quite well but as result of the medicine the weight has been coming off.  She struggled with some side effects including sulfur burps which are better as long as she varies where she injects the medicine.  She also has had some paresthesia skin manifestations of side effect which also tend to subside if she injects into her thigh.  She is making slow and steady progress.  We discussed strength training.  She is going to request a refill sometime in the next month or 2.  She will specify which pharmacy and dispense quantity.  Anticipate I will send in a 6-month supply to a mail order pharmacy.       History of insulin resistance         Mixed hyperlipidemia                BMI  Estimated body mass index is 40.28 kg/m  as calculated from the following:    Height as of 12/4/24: 1.575 m (5' 2\").    Weight as of 12/4/24: 99.9 kg (220 lb 3.2 oz).   Weight management plan: Discussed healthy diet and exercise guidelines    The longitudinal plan of care for the diagnosis(es)/condition(s) as documented were addressed during this visit. Due to the added complexity in care, I will continue to support Ana in the subsequent management and with ongoing continuity of " care.    Subjective   Ana is a 35 year old, presenting for the following health issues:  Weight Loss (Follow-up)        6/9/2025     5:03 PM   Additional Questions   Roomed by xl     Patient presents for treatment of chronic, comorbid conditions using intensive therapeutic lifestyle interventions including nutrition, physical activity, and behavior management.    - doing well.  She has had weight up and down.     - working to get more active. Now joined [id number] (as a family).    - medication benefits: does not work as well when she applies to her thigh. Sufleric burps with medication in stomach.  Side effects: skin changes - now improved.  Better since giving medication in thigh.      History of Present Illness       Reason for visit:  Obesity    She eats 2-3 servings of fruits and vegetables daily.She consumes 0 sweetened beverage(s) daily.She exercises with enough effort to increase her heart rate 20 to 29 minutes per day.  She exercises with enough effort to increase her heart rate 3 or less days per week.   She is taking medications regularly.                  Objective    Vitals - Patient Reported  Weight (Patient Reported): 78.6 kg (173 lb 4.8 oz)        Physical Exam   GENERAL: alert and no distress  EYES: Eyes grossly normal to inspection.  No discharge or erythema, or obvious scleral/conjunctival abnormalities.  RESP: No audible wheeze, cough, or visible cyanosis.    SKIN: Visible skin clear. No significant rash, abnormal pigmentation or lesions.  NEURO: Cranial nerves grossly intact.  Mentation and speech appropriate for age.  PSYCH: Appropriate affect, tone, and pace of words      Video-Visit Details    Type of service:  Video Visit   Originating Location (pt. Location): Home    Distant Location (provider location):  On-site  Platform used for Video Visit: [employer]  Signed Electronically by: Franck Marino MD

## 2025-06-09 NOTE — ASSESSMENT & PLAN NOTE
This patient is doing incredibly well.  She is on Zepbound and is down 65 pounds which corresponds to 27% total body weight since starting the medicine.  We reviewed her fluctuations of weight over the years.  She does not feel like she is changed nutrition all that much since starting the medicine and she believes she was actually doing quite well but as result of the medicine the weight has been coming off.  She struggled with some side effects including sulfur burps which are better as long as she varies where she injects the medicine.  She also has had some paresthesia skin manifestations of side effect which also tend to subside if she injects into her thigh.  She is making slow and steady progress.  We discussed strength training.  She is going to request a refill sometime in the next month or 2.  She will specify which pharmacy and dispense quantity.  Anticipate I will send in a 6-month supply to a mail order pharmacy.

## 2025-08-19 ENCOUNTER — MYC MEDICAL ADVICE (OUTPATIENT)
Dept: FAMILY MEDICINE | Facility: CLINIC | Age: 35
End: 2025-08-19
Payer: COMMERCIAL

## 2025-08-19 DIAGNOSIS — E66.813 CLASS 3 SEVERE OBESITY DUE TO EXCESS CALORIES WITH SERIOUS COMORBIDITY AND BODY MASS INDEX (BMI) OF 40.0 TO 44.9 IN ADULT (H): ICD-10-CM

## 2025-08-19 DIAGNOSIS — Z86.39 HISTORY OF INSULIN RESISTANCE: ICD-10-CM

## 2025-08-19 DIAGNOSIS — E78.2 MIXED HYPERLIPIDEMIA: ICD-10-CM
